# Patient Record
Sex: FEMALE | Race: WHITE | NOT HISPANIC OR LATINO
[De-identification: names, ages, dates, MRNs, and addresses within clinical notes are randomized per-mention and may not be internally consistent; named-entity substitution may affect disease eponyms.]

---

## 2019-04-01 ENCOUNTER — RESULT REVIEW (OUTPATIENT)
Age: 24
End: 2019-04-01

## 2019-10-24 ENCOUNTER — INPATIENT (INPATIENT)
Facility: HOSPITAL | Age: 24
LOS: 2 days | Discharge: HOME | End: 2019-10-27
Attending: OBSTETRICS & GYNECOLOGY | Admitting: OBSTETRICS & GYNECOLOGY
Payer: MEDICAID

## 2019-10-24 VITALS
TEMPERATURE: 99 F | HEART RATE: 67 BPM | DIASTOLIC BLOOD PRESSURE: 62 MMHG | SYSTOLIC BLOOD PRESSURE: 113 MMHG | RESPIRATION RATE: 16 BRPM | OXYGEN SATURATION: 100 %

## 2019-10-24 DIAGNOSIS — O36.5930 MATERNAL CARE FOR OTHER KNOWN OR SUSPECTED POOR FETAL GROWTH, THIRD TRIMESTER, NOT APPLICABLE OR UNSPECIFIED: ICD-10-CM

## 2019-10-24 DIAGNOSIS — Z3A.00 WEEKS OF GESTATION OF PREGNANCY NOT SPECIFIED: ICD-10-CM

## 2019-10-24 DIAGNOSIS — O26.899 OTHER SPECIFIED PREGNANCY RELATED CONDITIONS, UNSPECIFIED TRIMESTER: ICD-10-CM

## 2019-10-24 LAB
BASOPHILS # BLD AUTO: 0.03 K/UL — SIGNIFICANT CHANGE UP (ref 0–0.2)
BASOPHILS NFR BLD AUTO: 0.3 % — SIGNIFICANT CHANGE UP (ref 0–2)
EOSINOPHIL # BLD AUTO: 0.03 K/UL — SIGNIFICANT CHANGE UP (ref 0–0.5)
EOSINOPHIL NFR BLD AUTO: 0.3 % — SIGNIFICANT CHANGE UP (ref 0–6)
HCT VFR BLD CALC: 31 % — LOW (ref 34.5–45)
HGB BLD-MCNC: 9.4 G/DL — LOW (ref 11.5–15.5)
IMM GRANULOCYTES NFR BLD AUTO: 0.6 % — SIGNIFICANT CHANGE UP (ref 0–1.5)
LYMPHOCYTES # BLD AUTO: 2.23 K/UL — SIGNIFICANT CHANGE UP (ref 1–3.3)
LYMPHOCYTES # BLD AUTO: 24.5 % — SIGNIFICANT CHANGE UP (ref 13–44)
MCHC RBC-ENTMCNC: 24.5 PG — LOW (ref 27–34)
MCHC RBC-ENTMCNC: 30.3 % — LOW (ref 32–36)
MCV RBC AUTO: 80.7 FL — SIGNIFICANT CHANGE UP (ref 80–100)
MONOCYTES # BLD AUTO: 0.63 K/UL — SIGNIFICANT CHANGE UP (ref 0–0.9)
MONOCYTES NFR BLD AUTO: 6.9 % — SIGNIFICANT CHANGE UP (ref 2–14)
NEUTROPHILS # BLD AUTO: 6.12 K/UL — SIGNIFICANT CHANGE UP (ref 1.8–7.4)
NEUTROPHILS NFR BLD AUTO: 67.4 % — SIGNIFICANT CHANGE UP (ref 43–77)
NRBC # FLD: 0 K/UL — SIGNIFICANT CHANGE UP (ref 0–0)
PLATELET # BLD AUTO: 213 K/UL — SIGNIFICANT CHANGE UP (ref 150–400)
PMV BLD: 11.4 FL — SIGNIFICANT CHANGE UP (ref 7–13)
RBC # BLD: 3.84 M/UL — SIGNIFICANT CHANGE UP (ref 3.8–5.2)
RBC # FLD: 15.3 % — HIGH (ref 10.3–14.5)
WBC # BLD: 9.09 K/UL — SIGNIFICANT CHANGE UP (ref 3.8–10.5)
WBC # FLD AUTO: 9.09 K/UL — SIGNIFICANT CHANGE UP (ref 3.8–10.5)

## 2019-10-24 PROCEDURE — 93010 ELECTROCARDIOGRAM REPORT: CPT

## 2019-10-24 RX ORDER — SODIUM CHLORIDE 9 MG/ML
1000 INJECTION, SOLUTION INTRAVENOUS
Refills: 0 | Status: DISCONTINUED | OUTPATIENT
Start: 2019-10-24 | End: 2019-10-25

## 2019-10-24 RX ORDER — SODIUM CHLORIDE 9 MG/ML
1000 INJECTION, SOLUTION INTRAVENOUS
Refills: 0 | Status: DISCONTINUED | OUTPATIENT
Start: 2019-10-24 | End: 2019-10-24

## 2019-10-24 RX ORDER — OXYTOCIN 10 UNIT/ML
333.33 VIAL (ML) INJECTION
Qty: 20 | Refills: 0 | Status: DISCONTINUED | OUTPATIENT
Start: 2019-10-24 | End: 2019-10-24

## 2019-10-24 RX ORDER — INFLUENZA VIRUS VACCINE 15; 15; 15; 15 UG/.5ML; UG/.5ML; UG/.5ML; UG/.5ML
0.5 SUSPENSION INTRAMUSCULAR ONCE
Refills: 0 | Status: COMPLETED | OUTPATIENT
Start: 2019-10-24 | End: 2019-10-24

## 2019-10-24 RX ORDER — OXYTOCIN 10 UNIT/ML
333.33 VIAL (ML) INJECTION
Qty: 20 | Refills: 0 | Status: DISCONTINUED | OUTPATIENT
Start: 2019-10-24 | End: 2019-10-25

## 2019-10-24 RX ADMIN — SODIUM CHLORIDE 125 MILLILITER(S): 9 INJECTION, SOLUTION INTRAVENOUS at 19:07

## 2019-10-24 NOTE — OB PROVIDER H&P - NSHPPHYSICALEXAM_GEN_ALL_CORE
22 yo  @ 38.6 wks comes in c/o needing to be induced sent in from Dr Rocha office, saw Dr Wagner and followed by IUGR, last 3 fetal growth scans show n growth <10% 2565 gms no change from last growth scan sent to be induced. denies vb or lof. reports +GFM. 1-2 cms in office, ADRIANA 13, BPP 10/10, elevated dopplers.    GBS; negative  abd: soft, gravid, NT  TAS: cephalic  SVE: /-3  FHT: + accels, mod variability  toco: irregular  Vital Signs Last 24 Hrs  T(C): 36.9 (24 Oct 2019 16:33), Max: 37.1 (24 Oct 2019 16:14)  T(F): 98.42 (24 Oct 2019 16:33), Max: 98.8 (24 Oct 2019 16:14)  HR: 75 (24 Oct 2019 16:34) (67 - 75)  BP: 114/63 (24 Oct 2019 16:34) (113/62 - 114/63)  BP(mean): --  RR: 16 (24 Oct 2019 16:14) (16 - 16)  SpO2: 100% (24 Oct 2019 16:14) (100% - 100%)    meds: PNV  All: PCN rash    PMH: denies  PSH: denies  gynhx: denies  ob hx: denies

## 2019-10-24 NOTE — OB RN PATIENT PROFILE - ALERT: PERTINENT HISTORY
1st Trimester Sonogram/Follow up Sonogram for Growth/20 Week Level II Sonogram/BioPhysical Profile(s)/Fetal Non-Stress Test (NST)

## 2019-10-24 NOTE — OB PROVIDER H&P - ALERT: PERTINENT HISTORY
Follow up Sonogram for Growth/1st Trimester Sonogram/Fetal Non-Stress Test (NST)/20 Week Level II Sonogram/BioPhysical Profile(s)

## 2019-10-24 NOTE — CHART NOTE - NSCHARTNOTEFT_GEN_A_CORE
R1 Note: Pt examined at bedside for SVE     VS  T(C): 36.8 (10-24-19 @ 18:02)  HR: 67 (10-24-19 @ 22:06)  BP: 115/70 (10-24-19 @ 18:02)  RR: 16 (10-24-19 @ 17:38)  SpO2: 98% (10-24-19 @ 22:06)      SVE: 2.5/60/-3  FHT: 140 bpm mod variability, +acel, -decel  [place]: irregular    Plan:  IOL IUGR  FHT Cat 1 reassuring  4PO    to be d/w Dr. Esther Martínez PGY1

## 2019-10-24 NOTE — OB PROVIDER TRIAGE NOTE - NSHPPHYSICALEXAM_GEN_ALL_CORE
24 yo  @ 38.6 wks comes in c/o needing to be induced sent in from Dr Rocha office, saw Dr Wagner and followed by IUGR, last 3 fetal growth scans show n growth <10% 2565 gms no change from last growth scan sent to be induced. denies vb or lof. reports +GFM. 1-2 cms in office, ADRIANA 13, BPP 10/10, elevated dopplers.    GBS; negative  abd: soft, gravid, NT  TAS: cephalic  SVE: /-3  FHT: + accels, mod variability  toco: irregular  Vital Signs Last 24 Hrs  T(C): 36.9 (24 Oct 2019 16:33), Max: 37.1 (24 Oct 2019 16:14)  T(F): 98.42 (24 Oct 2019 16:33), Max: 98.8 (24 Oct 2019 16:14)  HR: 75 (24 Oct 2019 16:34) (67 - 75)  BP: 114/63 (24 Oct 2019 16:34) (113/62 - 114/63)  BP(mean): --  RR: 16 (24 Oct 2019 16:14) (16 - 16)  SpO2: 100% (24 Oct 2019 16:14) (100% - 100%)    meds: PNV  All: PCN rash    PMH: denies  PSH: denies  gynhx: denies  ob hx: denies

## 2019-10-24 NOTE — OB PROVIDER H&P - HISTORY OF PRESENT ILLNESS
24 yo  @ 38.6 wks comes in c/o needing to be induced sent in from Dr Rocha office, saw Dr Wagner and followed by IUGR, last 3 fetal growth scans show n growth <10% 2565 gms no change from last growth scan sent to be induced. denies vb or lof. reports +GFM. 1-2 cms in office, ADRIANA 13, BPP 10/10, elevated dopplers.    GBS; negative    meds: PNV  All: PCN rash    PMH: denies  PSH: denies  gynhx: denies  ob hx: denies

## 2019-10-24 NOTE — OB PROVIDER TRIAGE NOTE - NSOBPROVIDERNOTE_OBGYN_ALL_OB_FT
22 yo  @ 38.6 wks comes in c/o needing to be induced sent in from Dr Rocha office, saw Dr Wagner and followed by IUGR, last 3 fetal growth scans show n growth <10% 2565 gms no change from last growth scan sent to be induced. denies vb or lof. reports +GFM. 1-2 cms in office, ADRIANA 13, BPP 10/10, elevated dopplers.    GBS; negative  abd: soft, gravid, NT  TAS: cephalic  SVE: 60/-3  FHT: + accels, mod variability  toco: irregular  Vital Signs Last 24 Hrs  T(C): 36.9 (24 Oct 2019 16:33), Max: 37.1 (24 Oct 2019 16:14)  T(F): 98.42 (24 Oct 2019 16:33), Max: 98.8 (24 Oct 2019 16:14)  HR: 75 (24 Oct 2019 16:34) (67 - 75)  BP: 114/63 (24 Oct 2019 16:34) (113/62 - 114/63)  BP(mean): --  RR: 16 (24 Oct 2019 16:14) (16 - 16)  SpO2: 100% (24 Oct 2019 16:14) (100% - 100%)    meds: PNV  All: PCN rash    PMH: denies  PSH: denies  gynhx: denies  ob hx: denies    plan  d/w Dr Wagner admit for IOL @ 38.6 wks for IUGR not fetal growth <10%  epidural request  for PO cytotec  pulse ox/EKG- for Dr Wagner reporting c/o SOB in office, none in D&T

## 2019-10-24 NOTE — OB PROVIDER H&P - PROBLEM SELECTOR PLAN 1
d/w  Sample admit for IOL @ 38.6 wks for IUGR not fetal growth <10%  epidural request  for PO cytotec  pulse ox/EKG- for  Sample reporting c/o SOB in office, none in D&T

## 2019-10-25 ENCOUNTER — TRANSCRIPTION ENCOUNTER (OUTPATIENT)
Age: 24
End: 2019-10-25

## 2019-10-25 LAB — T PALLIDUM AB TITR SER: NEGATIVE — SIGNIFICANT CHANGE UP

## 2019-10-25 PROCEDURE — 59410 OBSTETRICAL CARE: CPT | Mod: U9

## 2019-10-25 RX ORDER — AER TRAVELER 0.5 G/1
1 SOLUTION RECTAL; TOPICAL EVERY 4 HOURS
Refills: 0 | Status: DISCONTINUED | OUTPATIENT
Start: 2019-10-25 | End: 2019-10-27

## 2019-10-25 RX ORDER — HYDROCORTISONE 1 %
1 OINTMENT (GRAM) TOPICAL EVERY 6 HOURS
Refills: 0 | Status: DISCONTINUED | OUTPATIENT
Start: 2019-10-25 | End: 2019-10-27

## 2019-10-25 RX ORDER — MAGNESIUM HYDROXIDE 400 MG/1
30 TABLET, CHEWABLE ORAL
Refills: 0 | Status: DISCONTINUED | OUTPATIENT
Start: 2019-10-25 | End: 2019-10-27

## 2019-10-25 RX ORDER — PRAMOXINE HYDROCHLORIDE 150 MG/15G
1 AEROSOL, FOAM RECTAL EVERY 4 HOURS
Refills: 0 | Status: DISCONTINUED | OUTPATIENT
Start: 2019-10-25 | End: 2019-10-27

## 2019-10-25 RX ORDER — DIPHENHYDRAMINE HCL 50 MG
25 CAPSULE ORAL EVERY 6 HOURS
Refills: 0 | Status: DISCONTINUED | OUTPATIENT
Start: 2019-10-25 | End: 2019-10-27

## 2019-10-25 RX ORDER — TETANUS TOXOID, REDUCED DIPHTHERIA TOXOID AND ACELLULAR PERTUSSIS VACCINE, ADSORBED 5; 2.5; 8; 8; 2.5 [IU]/.5ML; [IU]/.5ML; UG/.5ML; UG/.5ML; UG/.5ML
0.5 SUSPENSION INTRAMUSCULAR ONCE
Refills: 0 | Status: DISCONTINUED | OUTPATIENT
Start: 2019-10-25 | End: 2019-10-27

## 2019-10-25 RX ORDER — LANOLIN
1 OINTMENT (GRAM) TOPICAL EVERY 6 HOURS
Refills: 0 | Status: DISCONTINUED | OUTPATIENT
Start: 2019-10-25 | End: 2019-10-27

## 2019-10-25 RX ORDER — OXYTOCIN 10 UNIT/ML
333.33 VIAL (ML) INJECTION
Qty: 20 | Refills: 0 | Status: DISCONTINUED | OUTPATIENT
Start: 2019-10-25 | End: 2019-10-27

## 2019-10-25 RX ORDER — GLYCERIN ADULT
1 SUPPOSITORY, RECTAL RECTAL AT BEDTIME
Refills: 0 | Status: DISCONTINUED | OUTPATIENT
Start: 2019-10-25 | End: 2019-10-27

## 2019-10-25 RX ORDER — BENZOCAINE 10 %
1 GEL (GRAM) MUCOUS MEMBRANE EVERY 6 HOURS
Refills: 0 | Status: DISCONTINUED | OUTPATIENT
Start: 2019-10-25 | End: 2019-10-27

## 2019-10-25 RX ORDER — BUTORPHANOL TARTRATE 2 MG/ML
2 INJECTION, SOLUTION INTRAMUSCULAR; INTRAVENOUS ONCE
Refills: 0 | Status: DISCONTINUED | OUTPATIENT
Start: 2019-10-25 | End: 2019-10-25

## 2019-10-25 RX ORDER — SIMETHICONE 80 MG/1
80 TABLET, CHEWABLE ORAL EVERY 4 HOURS
Refills: 0 | Status: DISCONTINUED | OUTPATIENT
Start: 2019-10-25 | End: 2019-10-27

## 2019-10-25 RX ORDER — SODIUM CHLORIDE 9 MG/ML
3 INJECTION INTRAMUSCULAR; INTRAVENOUS; SUBCUTANEOUS EVERY 8 HOURS
Refills: 0 | Status: DISCONTINUED | OUTPATIENT
Start: 2019-10-25 | End: 2019-10-27

## 2019-10-25 RX ORDER — DIBUCAINE 1 %
1 OINTMENT (GRAM) RECTAL EVERY 6 HOURS
Refills: 0 | Status: DISCONTINUED | OUTPATIENT
Start: 2019-10-25 | End: 2019-10-27

## 2019-10-25 RX ADMIN — Medication 1000 MILLIUNIT(S)/MIN: at 14:19

## 2019-10-25 RX ADMIN — BUTORPHANOL TARTRATE 2 MILLIGRAM(S): 2 INJECTION, SOLUTION INTRAMUSCULAR; INTRAVENOUS at 03:03

## 2019-10-25 RX ADMIN — BUTORPHANOL TARTRATE 2 MILLIGRAM(S): 2 INJECTION, SOLUTION INTRAMUSCULAR; INTRAVENOUS at 03:18

## 2019-10-25 NOTE — CHART NOTE - NSCHARTNOTEFT_GEN_A_CORE
PA Note    patient seen & examined for cont of management. s/p epidural. comfortable    VS  T(C): 37.0 (10-25-19 @ 06:00)  HR: 67 (10-25-19 @ 09:03)  BP: 102/59 (10-25-19 @ 09:01)  RR: 16 (10-24-19 @ 17:38)  SpO2: 98% (10-25-19 @ 08:57)    130/mod vignesh/+accels/no decels  [place] q 2-4min  3/60/-3    cont efm/toco   cont PO cytotec   dw Dr Epstein

## 2019-10-25 NOTE — DISCHARGE NOTE OB - MEDICATION SUMMARY - MEDICATIONS TO TAKE
I will START or STAY ON the medications listed below when I get home from the hospital:    ibuprofen 600 mg oral tablet  -- 1 tab(s) by mouth every 6 hours  -- Indication: For Vaginal delivery    acetaminophen 325 mg oral tablet  -- 3 tab(s) by mouth   -- Indication: For Vaginal delivery    Prenatal Multivitamins with Folic Acid 1 mg oral tablet  -- 1 tab(s) by mouth once a day  -- Indication: For Vaginal delivery

## 2019-10-25 NOTE — DISCHARGE NOTE OB - CARE PLAN
Principal Discharge DX:	Vaginal delivery  Goal:	fully recovered  Assessment and plan of treatment:	dr davila 6 weeks

## 2019-10-25 NOTE — DISCHARGE NOTE OB - PROVIDER TOKENS
PROVIDER:[TOKEN:[[id number]]] PROVIDER:[TOKEN:[[id number]]],FREE:[LAST:[lola],FIRST:[Chente],PHONE:[phone]],FAX:[(   )    -]]

## 2019-10-25 NOTE — DISCHARGE NOTE OB - CARE PROVIDER_API CALL
Chente Rocha)  MaternalFetal Medicine; Obstetrics and Gynecology  [address] [place] [address]  Phone: [phone]  Fax: [phone]  Follow Up Time: Chente Rocha)  MaternalFetal Medicine; Obstetrics and Gynecology  [address] [place] [address]  Phone: [phone]  Fax: [phone]  Follow Up Time:     Chente rocha  Phone: [phone]  Fax: (   )    -  Follow Up Time:

## 2019-10-25 NOTE — DISCHARGE NOTE OB - CARE PROVIDERS DIRECT ADDRESSES
[email or web address]

## 2019-10-25 NOTE — CHART NOTE - NSCHARTNOTEFT_GEN_A_CORE
R2 Labor Note    At bedside for variables    Vital Signs Last 24 Hrs  T(C): 36.9 (25 Oct 2019 01:47), Max: 37.1 (24 Oct 2019 16:14)  T(F): 98.42 (25 Oct 2019 01:47), Max: 98.8 (24 Oct 2019 16:14)  HR: 68 (25 Oct 2019 05:19) (55 - 90)  BP: 104/- (25 Oct 2019 05:19) (92/53 - 119/59)  BP(mean): --  RR: 16 (24 Oct 2019 17:38) (16 - 16)  SpO2: 98% (25 Oct 2019 05:18) (91% - 100%)    FETAL HEART RATE: 145, mod vignesh, no accels, no decels. Discontinuous at times.    [place]: ctx3-4/10min     CERVICAL EXAM: 4.5/90/-2    PAIN SCALE (0-10): 8/10    IMPRESSION: Cat 2 tracing. Patient making good change. Will order morphine for pain control as patient cannot get an epidural per anesthesia 2/2 last Lovenox dose. Nurses to re-adjust monitors, if continous tracing, will not need ISE during epidural.     D/w Dr. Pérez Castillo PGY-2

## 2019-10-25 NOTE — OB RN DELIVERY SUMMARY - NS_SEPSISRSKCALC_OBGYN_ALL_OB_FT
EOS calculated successfully. EOS Risk Factor: 0.5/1000 live births ([facility] national incidence); GA=39w;Temp=98.96; ROM=2.533; GBS='Negative'; Antibiotics='No antibiotics or any antibiotics < 2 hrs prior to birth'

## 2019-10-25 NOTE — CHART NOTE - NSCHARTNOTEFT_GEN_A_CORE
R1 Note: Pt evaluated at bedside for SVE requesting pain medication    VS  T(C): 36.9 (10-25-19 @ 01:47)  HR: 56 (10-25-19 @ 02:48)  BP: 119/59 (10-25-19 @ 02:13)  RR: 16 (10-24-19 @ 17:38)  SpO2: 98% (10-25-19 @ 02:48)    SVE: unchanged  FHT: 140 bpm, mod vignesh, +acel, -decel  [place]: irregular    Plan:  IOL IUGR  PO Cyt   Stadol for pain mgmt  FHT Cat 1 reassuring    d/w Dr. Esther Martínez PGY1

## 2019-10-25 NOTE — DISCHARGE NOTE OB - PATIENT PORTAL LINK FT
You can access the FollowMyHealth Patient Portal offered by [facility] by registering at the following website: [email or web address]. By joining [facility]’s FollowMyHealth portal, you will also be able to view your health information using other applications (apps) compatible with our system.

## 2019-10-25 NOTE — CHART NOTE - NSCHARTNOTEFT_GEN_A_CORE
PA Note    patient seen & examined for back pain & pressure    VS  T(C): 37.2 (10-25-19 @ 13:00)  HR: 104 (10-25-19 @ 13:07)  BP: 110/56 (10-25-19 @ 13:00)  RR: 18 (10-25-19 @ 13:00)  SpO2: 99% (10-25-19 @ 13:07)    /mod vignesh/+accels/no decels  [place] q 3-5min  10/100/+1    cont efm/toco  Dr Epstein aware & en route to hospital  epidural top-off  [facility] attendings aware  ella meier

## 2019-10-25 NOTE — OB PROVIDER DELIVERY SUMMARY - NSPROVIDERDELIVERYNOTE_OBGYN_ALL_OB_FT
Spontaneous vaginal delivery of liveborn infant  from KANDACE position. Head, shoulders, and  body delivered easily. Infant was suctioned.  No mec. Cord was clamped and cut and  infant was passed to mother. Placenta  delivered intact with a 3 vessel cord. Fundal  massage was given and uterine fundus was  found to be firm. Vaginal exam revealed an  intact cervix, vaginal walls and sulci.  Excellent hemostasis was noted. patient  was stable and went to recovery. Count was  correct x 2.

## 2019-10-26 RX ORDER — IBUPROFEN 200 MG
600 TABLET ORAL EVERY 6 HOURS
Refills: 0 | Status: COMPLETED | OUTPATIENT
Start: 2019-10-26 | End: 2020-09-23

## 2019-10-26 RX ORDER — OXYCODONE HYDROCHLORIDE 5 MG/1
5 TABLET ORAL
Refills: 0 | Status: DISCONTINUED | OUTPATIENT
Start: 2019-10-26 | End: 2019-10-27

## 2019-10-26 RX ORDER — OXYCODONE HYDROCHLORIDE 5 MG/1
5 TABLET ORAL ONCE
Refills: 0 | Status: DISCONTINUED | OUTPATIENT
Start: 2019-10-26 | End: 2019-10-27

## 2019-10-26 RX ORDER — ACETAMINOPHEN 500 MG
975 TABLET ORAL
Refills: 0 | Status: DISCONTINUED | OUTPATIENT
Start: 2019-10-26 | End: 2019-10-27

## 2019-10-26 RX ORDER — IBUPROFEN 200 MG
600 TABLET ORAL EVERY 6 HOURS
Refills: 0 | Status: DISCONTINUED | OUTPATIENT
Start: 2019-10-26 | End: 2019-10-27

## 2019-10-26 RX ADMIN — Medication 600 MILLIGRAM(S): at 11:54

## 2019-10-26 RX ADMIN — SODIUM CHLORIDE 3 MILLILITER(S): 9 INJECTION INTRAMUSCULAR; INTRAVENOUS; SUBCUTANEOUS at 00:48

## 2019-10-26 RX ADMIN — Medication 1 TABLET(S): at 11:55

## 2019-10-26 RX ADMIN — Medication 600 MILLIGRAM(S): at 02:30

## 2019-10-26 RX ADMIN — Medication 600 MILLIGRAM(S): at 01:58

## 2019-10-26 NOTE — PROGRESS NOTE ADULT - SUBJECTIVE AND OBJECTIVE BOX
Anesthesia Post-op Note    POD#1 S/P vaginal delivery    Patient is doing well.  OOBAA. Tolerating clears.  Pain is tolerable.  No residual anesthetic issues or complications noted.    Margarita Burrows CRNA

## 2019-10-26 NOTE — PROGRESS NOTE ADULT - SUBJECTIVE AND OBJECTIVE BOX
S: Patient doing well. No complaints. Minimal lochia. Pain controlled.    O: Vital Signs Last 24 Hrs  T(C): 36.6 (26 Oct 2019 09:22), Max: 37.8 (25 Oct 2019 15:25)  T(F): 97.9 (26 Oct 2019 09:22), Max: 100 (25 Oct 2019 15:25)  HR: 54 (26 Oct 2019 09:22) (54 - 121)  BP: 104/47 (26 Oct 2019 09:22) (97/55 - 130/63)  BP(mean): --  RR: 17 (26 Oct 2019 09:22) (16 - 18)  SpO2: 100% (26 Oct 2019 09:22) (81% - 100%)    Gen: NAD  Abd: soft, Nontender, Nondistended, fundus firm  Ext: no tendern, mild edema    Labs:                        9.4    9.09  )-----------( 213      ( 24 Oct 2019 18:24 )             31.0       A: 23y PPD# s/p  doing well.    Plan:  Routine postpartum care  Encouraged out of bed  Regular diet

## 2019-10-27 VITALS
RESPIRATION RATE: 17 BRPM | DIASTOLIC BLOOD PRESSURE: 52 MMHG | HEART RATE: 72 BPM | TEMPERATURE: 99 F | OXYGEN SATURATION: 100 % | SYSTOLIC BLOOD PRESSURE: 118 MMHG

## 2019-10-27 RX ORDER — IBUPROFEN 200 MG
1 TABLET ORAL
Qty: 0 | Refills: 0 | DISCHARGE
Start: 2019-10-27

## 2019-10-27 RX ORDER — ACETAMINOPHEN 500 MG
3 TABLET ORAL
Qty: 0 | Refills: 0 | DISCHARGE
Start: 2019-10-27

## 2019-10-27 RX ADMIN — Medication 600 MILLIGRAM(S): at 03:38

## 2019-10-27 RX ADMIN — Medication 600 MILLIGRAM(S): at 03:08

## 2019-10-27 RX ADMIN — Medication 1 TABLET(S): at 13:51

## 2019-10-27 NOTE — LACTATION INITIAL EVALUATION - INTERVENTION OUTCOME
verbalizes understanding/Patient stated breastfeeding is going well .  No further assistance needed.

## 2019-10-27 NOTE — LACTATION INITIAL EVALUATION - LACTATION INTERVENTIONS
Reviewed [facility] book, hand expression, feeding log, proper latch.  Encouraged to feed on cue and follow the feeding log. Discussed outpatient resources  available, warm line , breastfeeding support group./initiate skin to skin/initiate hand expression routine

## 2019-12-09 ENCOUNTER — APPOINTMENT (OUTPATIENT)
Dept: OBGYN | Facility: CLINIC | Age: 24
End: 2019-12-09

## 2019-12-16 ENCOUNTER — APPOINTMENT (OUTPATIENT)
Dept: OBGYN | Facility: CLINIC | Age: 24
End: 2019-12-16

## 2020-01-06 ENCOUNTER — APPOINTMENT (OUTPATIENT)
Dept: OBGYN | Facility: CLINIC | Age: 25
End: 2020-01-06
Payer: MEDICAID

## 2020-01-06 PROCEDURE — 81025 URINE PREGNANCY TEST: CPT

## 2020-01-06 PROCEDURE — 99212 OFFICE O/P EST SF 10 MIN: CPT

## 2020-01-13 ENCOUNTER — APPOINTMENT (OUTPATIENT)
Dept: ANTEPARTUM | Facility: CLINIC | Age: 25
End: 2020-01-13
Payer: MEDICAID

## 2020-01-13 PROCEDURE — 76816 OB US FOLLOW-UP PER FETUS: CPT

## 2020-01-13 PROCEDURE — 76819 FETAL BIOPHYS PROFIL W/O NST: CPT

## 2020-01-28 ENCOUNTER — APPOINTMENT (OUTPATIENT)
Dept: ANTEPARTUM | Facility: CLINIC | Age: 25
End: 2020-01-28

## 2020-02-04 ENCOUNTER — APPOINTMENT (OUTPATIENT)
Dept: ANTEPARTUM | Facility: CLINIC | Age: 25
End: 2020-02-04
Payer: MEDICAID

## 2020-02-04 PROCEDURE — 76801 OB US < 14 WKS SINGLE FETUS: CPT

## 2020-02-28 ENCOUNTER — APPOINTMENT (OUTPATIENT)
Dept: ANTEPARTUM | Facility: CLINIC | Age: 25
End: 2020-02-28
Payer: MEDICAID

## 2020-02-28 PROCEDURE — 76813 OB US NUCHAL MEAS 1 GEST: CPT

## 2020-03-05 ENCOUNTER — APPOINTMENT (OUTPATIENT)
Dept: OBGYN | Facility: CLINIC | Age: 25
End: 2020-03-05

## 2020-03-24 ENCOUNTER — APPOINTMENT (OUTPATIENT)
Dept: OBGYN | Facility: CLINIC | Age: 25
End: 2020-03-24

## 2020-04-24 ENCOUNTER — APPOINTMENT (OUTPATIENT)
Dept: ANTEPARTUM | Facility: CLINIC | Age: 25
End: 2020-04-24

## 2020-04-30 ENCOUNTER — APPOINTMENT (OUTPATIENT)
Dept: ANTEPARTUM | Facility: CLINIC | Age: 25
End: 2020-04-30
Payer: MEDICAID

## 2020-04-30 PROCEDURE — 76817 TRANSVAGINAL US OBSTETRIC: CPT

## 2020-04-30 PROCEDURE — 76811 OB US DETAILED SNGL FETUS: CPT

## 2020-05-08 ENCOUNTER — APPOINTMENT (OUTPATIENT)
Dept: PEDIATRIC CARDIOLOGY | Facility: CLINIC | Age: 25
End: 2020-05-08
Payer: MEDICAID

## 2020-05-08 PROCEDURE — 76827 ECHO EXAM OF FETAL HEART: CPT

## 2020-05-08 PROCEDURE — 76825 ECHO EXAM OF FETAL HEART: CPT

## 2020-05-08 PROCEDURE — 76821 MIDDLE CEREBRAL ARTERY ECHO: CPT

## 2020-05-08 PROCEDURE — 99201 OFFICE OUTPATIENT NEW 10 MINUTES: CPT | Mod: 25

## 2020-05-08 PROCEDURE — 93325 DOPPLER ECHO COLOR FLOW MAPG: CPT | Mod: 59

## 2020-05-08 PROCEDURE — 76820 UMBILICAL ARTERY ECHO: CPT

## 2020-06-04 ENCOUNTER — APPOINTMENT (OUTPATIENT)
Dept: OBGYN | Facility: CLINIC | Age: 25
End: 2020-06-04
Payer: MEDICAID

## 2020-06-04 PROCEDURE — 0502F SUBSEQUENT PRENATAL CARE: CPT

## 2020-07-13 ENCOUNTER — APPOINTMENT (OUTPATIENT)
Dept: ANTEPARTUM | Facility: CLINIC | Age: 25
End: 2020-07-13
Payer: MEDICAID

## 2020-07-13 PROCEDURE — 76819 FETAL BIOPHYS PROFIL W/O NST: CPT

## 2020-07-13 PROCEDURE — 76816 OB US FOLLOW-UP PER FETUS: CPT

## 2020-07-27 ENCOUNTER — APPOINTMENT (OUTPATIENT)
Dept: OBGYN | Facility: CLINIC | Age: 25
End: 2020-07-27
Payer: MEDICAID

## 2020-07-27 PROCEDURE — 0502F SUBSEQUENT PRENATAL CARE: CPT

## 2020-08-13 ENCOUNTER — OUTPATIENT (OUTPATIENT)
Dept: INPATIENT UNIT | Facility: HOSPITAL | Age: 25
LOS: 1 days | Discharge: HOME | End: 2020-08-13
Payer: MEDICAID

## 2020-08-13 VITALS — TEMPERATURE: 99 F

## 2020-08-13 VITALS — SYSTOLIC BLOOD PRESSURE: 113 MMHG | HEART RATE: 70 BPM | DIASTOLIC BLOOD PRESSURE: 58 MMHG

## 2020-08-13 DIAGNOSIS — Z3A.00 WEEKS OF GESTATION OF PREGNANCY NOT SPECIFIED: ICD-10-CM

## 2020-08-13 DIAGNOSIS — O26.899 OTHER SPECIFIED PREGNANCY RELATED CONDITIONS, UNSPECIFIED TRIMESTER: ICD-10-CM

## 2020-08-13 LAB
APPEARANCE UR: CLEAR — SIGNIFICANT CHANGE UP
BACTERIA # UR AUTO: NEGATIVE — SIGNIFICANT CHANGE UP
BILIRUB UR-MCNC: NEGATIVE — SIGNIFICANT CHANGE UP
BLOOD UR QL VISUAL: NEGATIVE — SIGNIFICANT CHANGE UP
COLOR SPEC: SIGNIFICANT CHANGE UP
GLUCOSE UR-MCNC: NEGATIVE — SIGNIFICANT CHANGE UP
HYALINE CASTS # UR AUTO: NEGATIVE — SIGNIFICANT CHANGE UP
KETONES UR-MCNC: NEGATIVE — SIGNIFICANT CHANGE UP
LEUKOCYTE ESTERASE UR-ACNC: SIGNIFICANT CHANGE UP
NITRITE UR-MCNC: NEGATIVE — SIGNIFICANT CHANGE UP
PH UR: 7.5 — SIGNIFICANT CHANGE UP (ref 5–8)
PROT UR-MCNC: NEGATIVE — SIGNIFICANT CHANGE UP
RBC CASTS # UR COMP ASSIST: SIGNIFICANT CHANGE UP (ref 0–?)
SP GR SPEC: 1.01 — SIGNIFICANT CHANGE UP (ref 1–1.04)
SQUAMOUS # UR AUTO: SIGNIFICANT CHANGE UP
UROBILINOGEN FLD QL: NORMAL — SIGNIFICANT CHANGE UP
WBC UR QL: SIGNIFICANT CHANGE UP (ref 0–?)

## 2020-08-13 PROCEDURE — 99214 OFFICE O/P EST MOD 30 MIN: CPT

## 2020-08-13 PROCEDURE — 76818 FETAL BIOPHYS PROFILE W/NST: CPT | Mod: 26

## 2020-08-13 NOTE — OB PROVIDER TRIAGE NOTE - NSOBPROVIDERNOTE_OBGYN_ALL_OB_FT
No evidence of  labor at this time     d/w Dr. Avelar     -Cleared for discharge   -Keep scheduled appt Monday  -Fetal kick count reviewed   -Warning signs reviewed

## 2020-08-13 NOTE — OB PROVIDER TRIAGE NOTE - NSHPPHYSICALEXAM_GEN_ALL_CORE
Vital Signs Last 24 Hrs  T(C): 37 (13 Aug 2020 21:24), Max: 37.0 (13 Aug 2020 21:20)  T(F): 98.6 (13 Aug 2020 21:24), Max: 98.6 (13 Aug 2020 21:20)  HR: 74 (13 Aug 2020 21:24) (74 - 74)  BP: 113/57 (13 Aug 2020 21:24) (113/57 - 113/57)  RR: 16 (13 Aug 2020 21:24) (16 - 16)    Abdomen soft, nontender   No  cva tenderness     SVE 2.5/50/-3    NST in progress   irregular contractions by toco Vital Signs Last 24 Hrs  T(C): 37 (13 Aug 2020 21:24), Max: 37.0 (13 Aug 2020 21:20)  T(F): 98.6 (13 Aug 2020 21:24), Max: 98.6 (13 Aug 2020 21:20)  HR: 74 (13 Aug 2020 21:24) (74 - 74)  BP: 113/57 (13 Aug 2020 21:24) (113/57 - 113/57)  RR: 16 (13 Aug 2020 21:24) (16 - 16)    Abdomen soft, nontender   No  cva tenderness     SVE 2.5/50/-3    Category 1 tracing, , moderate variability, + accels, no decels   irregular contractions by toco    Exam unchanged after 2 hours

## 2020-08-13 NOTE — OB PROVIDER TRIAGE NOTE - HISTORY OF PRESENT ILLNESS
23 yo  36 weeks with complaints of contractions that have been intermittent all day but have become more regular after 1900, pt reports pain q 10 min. Pt reports fetal movement, denies leaking of fluid or vaginal bleeding.       Current a/p complications: Denies     Allergies: Penicillin- rash   Meds: PNV  PMH: Denies   PSH: Denies   OB/GYN: 2019 FT  uncomplicated 6#5   Social: Denies   Psychosocial: Denies

## 2020-08-13 NOTE — OB PROVIDER TRIAGE NOTE - NSHPLABSRESULTS_GEN_ALL_CORE
UA pending Urinalysis Basic - ( 13 Aug 2020 22:40 )    Color: LIGHT YELLOW / Appearance: CLEAR / S.011 / pH: 7.5  Gluc: NEGATIVE / Ketone: NEGATIVE  / Bili: NEGATIVE / Urobili: NORMAL   Blood: NEGATIVE / Protein: NEGATIVE / Nitrite: NEGATIVE   Leuk Esterase: TRACE / RBC: 0-2 / WBC 3-5   Sq Epi: OCC / Non Sq Epi: x / Bacteria: NEGATIVE

## 2020-08-13 NOTE — OB PROVIDER TRIAGE NOTE - ADDITIONAL INSTRUCTIONS
-Cleared for discharge   -Keep scheduled appt Monday  -Fetal kick count reviewed   -Warning signs reviewed

## 2020-08-17 ENCOUNTER — APPOINTMENT (OUTPATIENT)
Dept: OBGYN | Facility: CLINIC | Age: 25
End: 2020-08-17

## 2020-08-19 ENCOUNTER — OUTPATIENT (OUTPATIENT)
Dept: INPATIENT UNIT | Facility: HOSPITAL | Age: 25
LOS: 1 days | Discharge: HOME | End: 2020-08-19
Payer: MEDICAID

## 2020-08-19 VITALS — HEART RATE: 65 BPM | DIASTOLIC BLOOD PRESSURE: 57 MMHG | SYSTOLIC BLOOD PRESSURE: 114 MMHG

## 2020-08-19 VITALS
SYSTOLIC BLOOD PRESSURE: 103 MMHG | TEMPERATURE: 98 F | RESPIRATION RATE: 18 BRPM | DIASTOLIC BLOOD PRESSURE: 55 MMHG | HEART RATE: 83 BPM

## 2020-08-19 DIAGNOSIS — O26.899 OTHER SPECIFIED PREGNANCY RELATED CONDITIONS, UNSPECIFIED TRIMESTER: ICD-10-CM

## 2020-08-19 DIAGNOSIS — Z3A.00 WEEKS OF GESTATION OF PREGNANCY NOT SPECIFIED: ICD-10-CM

## 2020-08-19 PROCEDURE — 59025 FETAL NON-STRESS TEST: CPT | Mod: 26

## 2020-08-19 PROCEDURE — 99215 OFFICE O/P EST HI 40 MIN: CPT | Mod: 25

## 2020-08-19 NOTE — OB PROVIDER TRIAGE NOTE - NSOBPROVIDERNOTE_OBGYN_ALL_OB_FT
25yo  female  @ 36.6 wks SLIUP uncomp PNC here with ctx's Q8-10 min  -VE per Dr Rocha-1/long  -NST reviewed by Dr Ly  -pt was dw dr Rocha. Pt was dc home with instructions

## 2020-08-19 NOTE — OB PROVIDER TRIAGE NOTE - HISTORY OF PRESENT ILLNESS
25yo  female  @ 36.6 wks SLIUP uncomp PNC here complaining of ctx's Q8-10 min. Pt is intact with GFM.     Pt requesting Dr Rocha exam her    Pmhx-denies  Pshx/Hosp- denies  Meds-PNV  NKDA  Past ob- FT uncomp  Gyn-denies

## 2020-08-19 NOTE — OB PROVIDER TRIAGE NOTE - NSHPPHYSICALEXAM_GEN_ALL_CORE
Gen: A&O x 3; NAD  Vitals: BP-108/55; P-72; T-36.7    Pulm-CTA B/L; no wheezes  Cor-clear S1S2  Abd exam-soft and nontender    NST cat I with accels and mod variability; infreq ctx    VE per Dr Rocha-1/long/-2

## 2020-08-24 ENCOUNTER — APPOINTMENT (OUTPATIENT)
Dept: OBGYN | Facility: CLINIC | Age: 25
End: 2020-08-24
Payer: MEDICAID

## 2020-08-24 PROCEDURE — 0502F SUBSEQUENT PRENATAL CARE: CPT

## 2020-08-31 ENCOUNTER — APPOINTMENT (OUTPATIENT)
Dept: OBGYN | Facility: CLINIC | Age: 25
End: 2020-08-31
Payer: MEDICAID

## 2020-08-31 ENCOUNTER — TRANSCRIPTION ENCOUNTER (OUTPATIENT)
Age: 25
End: 2020-08-31

## 2020-08-31 ENCOUNTER — INPATIENT (INPATIENT)
Facility: HOSPITAL | Age: 25
LOS: 1 days | Discharge: HOME | End: 2020-09-02
Attending: OBSTETRICS & GYNECOLOGY | Admitting: OBSTETRICS & GYNECOLOGY
Payer: MEDICAID

## 2020-08-31 VITALS — SYSTOLIC BLOOD PRESSURE: 115 MMHG | HEART RATE: 75 BPM | DIASTOLIC BLOOD PRESSURE: 56 MMHG

## 2020-08-31 DIAGNOSIS — O26.899 OTHER SPECIFIED PREGNANCY RELATED CONDITIONS, UNSPECIFIED TRIMESTER: ICD-10-CM

## 2020-08-31 DIAGNOSIS — Z3A.00 WEEKS OF GESTATION OF PREGNANCY NOT SPECIFIED: ICD-10-CM

## 2020-08-31 LAB
BASOPHILS # BLD AUTO: 0.03 K/UL — SIGNIFICANT CHANGE UP (ref 0–0.2)
BASOPHILS NFR BLD AUTO: 0.3 % — SIGNIFICANT CHANGE UP (ref 0–2)
EOSINOPHIL # BLD AUTO: 0.04 K/UL — SIGNIFICANT CHANGE UP (ref 0–0.5)
EOSINOPHIL NFR BLD AUTO: 0.4 % — SIGNIFICANT CHANGE UP (ref 0–6)
HCT VFR BLD CALC: 24.3 % — LOW (ref 34.5–45)
HGB BLD-MCNC: 7.3 G/DL — LOW (ref 11.5–15.5)
IMM GRANULOCYTES NFR BLD AUTO: 0.7 % — SIGNIFICANT CHANGE UP (ref 0–1.5)
LYMPHOCYTES # BLD AUTO: 2.26 K/UL — SIGNIFICANT CHANGE UP (ref 1–3.3)
LYMPHOCYTES # BLD AUTO: 23.1 % — SIGNIFICANT CHANGE UP (ref 13–44)
MCHC RBC-ENTMCNC: 20.3 PG — LOW (ref 27–34)
MCHC RBC-ENTMCNC: 30 % — LOW (ref 32–36)
MCV RBC AUTO: 67.5 FL — LOW (ref 80–100)
MONOCYTES # BLD AUTO: 0.52 K/UL — SIGNIFICANT CHANGE UP (ref 0–0.9)
MONOCYTES NFR BLD AUTO: 5.3 % — SIGNIFICANT CHANGE UP (ref 2–14)
NEUTROPHILS # BLD AUTO: 6.86 K/UL — SIGNIFICANT CHANGE UP (ref 1.8–7.4)
NEUTROPHILS NFR BLD AUTO: 70.2 % — SIGNIFICANT CHANGE UP (ref 43–77)
NRBC # FLD: 0 K/UL — SIGNIFICANT CHANGE UP (ref 0–0)
PLATELET # BLD AUTO: 178 K/UL — SIGNIFICANT CHANGE UP (ref 150–400)
PMV BLD: 11.1 FL — SIGNIFICANT CHANGE UP (ref 7–13)
RBC # BLD: 3.6 M/UL — LOW (ref 3.8–5.2)
RBC # FLD: 19.1 % — HIGH (ref 10.3–14.5)
WBC # BLD: 9.78 K/UL — SIGNIFICANT CHANGE UP (ref 3.8–10.5)
WBC # FLD AUTO: 9.78 K/UL — SIGNIFICANT CHANGE UP (ref 3.8–10.5)

## 2020-08-31 PROCEDURE — 0502F SUBSEQUENT PRENATAL CARE: CPT

## 2020-08-31 RX ORDER — OXYTOCIN 10 UNIT/ML
333.33 VIAL (ML) INJECTION
Qty: 20 | Refills: 0 | Status: DISCONTINUED | OUTPATIENT
Start: 2020-08-31 | End: 2020-09-01

## 2020-08-31 RX ORDER — SODIUM CHLORIDE 9 MG/ML
1000 INJECTION, SOLUTION INTRAVENOUS ONCE
Refills: 0 | Status: COMPLETED | OUTPATIENT
Start: 2020-08-31 | End: 2020-08-31

## 2020-08-31 RX ORDER — SODIUM CHLORIDE 9 MG/ML
1000 INJECTION, SOLUTION INTRAVENOUS
Refills: 0 | Status: DISCONTINUED | OUTPATIENT
Start: 2020-08-31 | End: 2020-09-01

## 2020-08-31 NOTE — OB PROVIDER TRIAGE NOTE - HISTORY OF PRESENT ILLNESS
25 yo  38 4/7 weeks with complaints of worsening contractions. Pt reports fetal movement, denies leaking of fluid or vaginal bleeding.       Current a/p complications: Denies     Allergies: Penicillin- rash   Meds: PNV  PMH: Denies   PSH: Denies   OB/GYN: 2019 FT  uncomplicated 6#5   Social: Denies   Psychosocial: Denies

## 2020-08-31 NOTE — OB PROVIDER TRIAGE NOTE - NS_FETALMONCTXRES_OBGYN_ALL_OB
REVIEW OF SYSTEMS:  General:  +fever, no chills  HEENT: no headache, no vision changes  Cardiac: no chest pain, no palpitations  Respiratory: +cough, no shortness of breath  Gastrointestinal: +abdominal pain, +nausea, +vomiting, no diarrhea  Genitourinary: no hematuria, no dysuria, no urinary frequency  Extremities: no extremity swelling, no extremity pain  Neuro: no focal weakness, no numbness/tingling of the extremities, no decreased sensation  Heme: no easy bleeding, no easy bruising  Skin: no jaundice,  no rashes, no lesions  All other ROS as documented in HPI  -[place], PGY-2
Relaxed

## 2020-08-31 NOTE — OB PROVIDER TRIAGE NOTE - NSOBPROVIDERNOTE_OBGYN_ALL_OB_FT
d/w Dr. Avelar     -Admit to L&D for expectant management   -routine orders  -covid swab sent   -approved for epidural

## 2020-08-31 NOTE — OB PROVIDER TRIAGE NOTE - NSHPPHYSICALEXAM_GEN_ALL_CORE
Vital Signs Last 24 Hrs  T(C): 36.9 (31 Aug 2020 22:11), Max: 36.9 (31 Aug 2020 22:11)  T(F): 98.4 (31 Aug 2020 22:11), Max: 98.4 (31 Aug 2020 22:11)  HR: 75 (31 Aug 2020 22:11) (75 - 75)  BP: 115/56 (31 Aug 2020 22:11) (115/56 - 115/56)  RR: 16 (31 Aug 2020 22:11) (16 - 16)      Abdomen soft, nontender   No  cva tenderness     SVE 4/80/-3    Category 1 tracing, , moderate variability, + accels, no decels   irregular contractions by toco

## 2020-08-31 NOTE — OB PROVIDER H&P - HISTORY OF PRESENT ILLNESS
23 yo  38 4/7 weeks with complaints of worsening contractions. Pt reports fetal movement, denies leaking of fluid or vaginal bleeding.       Current a/p complications: Denies     Allergies: Penicillin- rash   Meds: PNV  PMH: Denies   PSH: Denies   OB/GYN: 2019 FT  uncomplicated 6#5   Social: Denies   Psychosocial: Denies

## 2020-09-01 LAB
BLD GP AB SCN SERPL QL: NEGATIVE — SIGNIFICANT CHANGE UP
RH IG SCN BLD-IMP: POSITIVE — SIGNIFICANT CHANGE UP
RH IG SCN BLD-IMP: POSITIVE — SIGNIFICANT CHANGE UP
SARS-COV-2 RNA SPEC QL NAA+PROBE: SIGNIFICANT CHANGE UP

## 2020-09-01 PROCEDURE — 59400 OBSTETRICAL CARE: CPT | Mod: U9

## 2020-09-01 RX ORDER — SIMETHICONE 80 MG/1
80 TABLET, CHEWABLE ORAL EVERY 4 HOURS
Refills: 0 | Status: DISCONTINUED | OUTPATIENT
Start: 2020-09-01 | End: 2020-09-02

## 2020-09-01 RX ORDER — KETOROLAC TROMETHAMINE 30 MG/ML
30 SYRINGE (ML) INJECTION ONCE
Refills: 0 | Status: DISCONTINUED | OUTPATIENT
Start: 2020-09-01 | End: 2020-09-01

## 2020-09-01 RX ORDER — SODIUM CHLORIDE 9 MG/ML
3 INJECTION INTRAMUSCULAR; INTRAVENOUS; SUBCUTANEOUS EVERY 8 HOURS
Refills: 0 | Status: DISCONTINUED | OUTPATIENT
Start: 2020-09-01 | End: 2020-09-02

## 2020-09-01 RX ORDER — MAGNESIUM HYDROXIDE 400 MG/1
30 TABLET, CHEWABLE ORAL
Refills: 0 | Status: DISCONTINUED | OUTPATIENT
Start: 2020-09-01 | End: 2020-09-02

## 2020-09-01 RX ORDER — ACETAMINOPHEN 500 MG
3 TABLET ORAL
Qty: 0 | Refills: 0 | DISCHARGE
Start: 2020-09-01

## 2020-09-01 RX ORDER — DIPHENHYDRAMINE HCL 50 MG
25 CAPSULE ORAL EVERY 6 HOURS
Refills: 0 | Status: DISCONTINUED | OUTPATIENT
Start: 2020-09-01 | End: 2020-09-02

## 2020-09-01 RX ORDER — HYDROCORTISONE 1 %
1 OINTMENT (GRAM) TOPICAL EVERY 6 HOURS
Refills: 0 | Status: DISCONTINUED | OUTPATIENT
Start: 2020-09-01 | End: 2020-09-02

## 2020-09-01 RX ORDER — SENNA PLUS 8.6 MG/1
2 TABLET ORAL AT BEDTIME
Refills: 0 | Status: DISCONTINUED | OUTPATIENT
Start: 2020-09-01 | End: 2020-09-02

## 2020-09-01 RX ORDER — LANOLIN
1 OINTMENT (GRAM) TOPICAL EVERY 6 HOURS
Refills: 0 | Status: DISCONTINUED | OUTPATIENT
Start: 2020-09-01 | End: 2020-09-02

## 2020-09-01 RX ORDER — BENZOCAINE 10 %
1 GEL (GRAM) MUCOUS MEMBRANE EVERY 6 HOURS
Refills: 0 | Status: DISCONTINUED | OUTPATIENT
Start: 2020-09-01 | End: 2020-09-02

## 2020-09-01 RX ORDER — AER TRAVELER 0.5 G/1
1 SOLUTION RECTAL; TOPICAL EVERY 4 HOURS
Refills: 0 | Status: DISCONTINUED | OUTPATIENT
Start: 2020-09-01 | End: 2020-09-02

## 2020-09-01 RX ORDER — ACETAMINOPHEN 500 MG
975 TABLET ORAL
Refills: 0 | Status: DISCONTINUED | OUTPATIENT
Start: 2020-09-01 | End: 2020-09-02

## 2020-09-01 RX ORDER — ASCORBIC ACID 60 MG
500 TABLET,CHEWABLE ORAL DAILY
Refills: 0 | Status: DISCONTINUED | OUTPATIENT
Start: 2020-09-01 | End: 2020-09-02

## 2020-09-01 RX ORDER — IBUPROFEN 200 MG
600 TABLET ORAL EVERY 6 HOURS
Refills: 0 | Status: COMPLETED | OUTPATIENT
Start: 2020-09-01 | End: 2021-07-31

## 2020-09-01 RX ORDER — OXYCODONE HYDROCHLORIDE 5 MG/1
5 TABLET ORAL
Refills: 0 | Status: DISCONTINUED | OUTPATIENT
Start: 2020-09-01 | End: 2020-09-02

## 2020-09-01 RX ORDER — PRAMOXINE HYDROCHLORIDE 150 MG/15G
1 AEROSOL, FOAM RECTAL EVERY 4 HOURS
Refills: 0 | Status: DISCONTINUED | OUTPATIENT
Start: 2020-09-01 | End: 2020-09-02

## 2020-09-01 RX ORDER — TETANUS TOXOID, REDUCED DIPHTHERIA TOXOID AND ACELLULAR PERTUSSIS VACCINE, ADSORBED 5; 2.5; 8; 8; 2.5 [IU]/.5ML; [IU]/.5ML; UG/.5ML; UG/.5ML; UG/.5ML
0.5 SUSPENSION INTRAMUSCULAR ONCE
Refills: 0 | Status: DISCONTINUED | OUTPATIENT
Start: 2020-09-01 | End: 2020-09-02

## 2020-09-01 RX ORDER — OXYCODONE HYDROCHLORIDE 5 MG/1
5 TABLET ORAL ONCE
Refills: 0 | Status: DISCONTINUED | OUTPATIENT
Start: 2020-09-01 | End: 2020-09-02

## 2020-09-01 RX ORDER — IBUPROFEN 200 MG
1 TABLET ORAL
Qty: 0 | Refills: 0 | DISCHARGE
Start: 2020-09-01

## 2020-09-01 RX ORDER — DIBUCAINE 1 %
1 OINTMENT (GRAM) RECTAL EVERY 6 HOURS
Refills: 0 | Status: DISCONTINUED | OUTPATIENT
Start: 2020-09-01 | End: 2020-09-02

## 2020-09-01 RX ORDER — IBUPROFEN 200 MG
600 TABLET ORAL EVERY 6 HOURS
Refills: 0 | Status: DISCONTINUED | OUTPATIENT
Start: 2020-09-01 | End: 2020-09-02

## 2020-09-01 RX ORDER — OXYTOCIN 10 UNIT/ML
333.33 VIAL (ML) INJECTION
Qty: 20 | Refills: 0 | Status: DISCONTINUED | OUTPATIENT
Start: 2020-09-01 | End: 2020-09-01

## 2020-09-01 RX ORDER — FERROUS SULFATE 325(65) MG
325 TABLET ORAL THREE TIMES A DAY
Refills: 0 | Status: DISCONTINUED | OUTPATIENT
Start: 2020-09-01 | End: 2020-09-02

## 2020-09-01 RX ADMIN — SODIUM CHLORIDE 3 MILLILITER(S): 9 INJECTION INTRAMUSCULAR; INTRAVENOUS; SUBCUTANEOUS at 22:07

## 2020-09-01 RX ADMIN — Medication 600 MILLIGRAM(S): at 12:00

## 2020-09-01 RX ADMIN — Medication 600 MILLIGRAM(S): at 17:29

## 2020-09-01 RX ADMIN — Medication 325 MILLIGRAM(S): at 22:11

## 2020-09-01 RX ADMIN — Medication 500 MILLIGRAM(S): at 11:18

## 2020-09-01 RX ADMIN — Medication 975 MILLIGRAM(S): at 21:30

## 2020-09-01 RX ADMIN — Medication 1000 MILLIUNIT(S)/MIN: at 03:32

## 2020-09-01 RX ADMIN — Medication 325 MILLIGRAM(S): at 07:12

## 2020-09-01 RX ADMIN — Medication 975 MILLIGRAM(S): at 20:24

## 2020-09-01 RX ADMIN — SODIUM CHLORIDE 3 MILLILITER(S): 9 INJECTION INTRAMUSCULAR; INTRAVENOUS; SUBCUTANEOUS at 17:28

## 2020-09-01 RX ADMIN — SODIUM CHLORIDE 1000 MILLILITER(S): 9 INJECTION, SOLUTION INTRAVENOUS at 00:00

## 2020-09-01 RX ADMIN — Medication 600 MILLIGRAM(S): at 18:00

## 2020-09-01 RX ADMIN — Medication 975 MILLIGRAM(S): at 07:13

## 2020-09-01 RX ADMIN — Medication 600 MILLIGRAM(S): at 11:18

## 2020-09-01 NOTE — PROVIDER CONTACT NOTE (OTHER) - RECOMMENDATIONS
provider assessment Mastoid Interpolation Flap Text: A decision was made to reconstruct the defect utilizing an interpolation axial flap and a staged reconstruction.  A telfa template was made of the defect.  This telfa template was then used to outline the mastoid interpolation flap.  The donor area for the pedicle flap was then injected with anesthesia.  The flap was excised through the skin and subcutaneous tissue down to the layer of the underlying musculature.  The pedicle flap was carefully excised within this deep plane to maintain its blood supply.  The edges of the donor site were undermined.   The donor site was closed in a primary fashion.  The pedicle was then rotated into position and sutured.  Once the tube was sutured into place, adequate blood supply was confirmed with blanching and refill.  The pedicle was then wrapped with xeroform gauze and dressed appropriately with a telfa and gauze bandage to ensure continued blood supply and protect the attached pedicle.

## 2020-09-01 NOTE — DISCHARGE NOTE OB - PATIENT PORTAL LINK FT
You can access the FollowMyHealth Patient Portal offered by [facility] by registering at the following website: [email or web address]. By joining [facility]’s FollowMyHealth portal, you will also be able to view your health information using other applications (apps) compatible with our system.

## 2020-09-01 NOTE — CHART NOTE - NSCHARTNOTEFT_GEN_A_CORE
R1 Labor Note    S: Patient evaluated at bedside for cervical change.     O:  T(C): 36.9 (20 @ 23:36), Max: 36.9 (20 @ 22:11)  HR: 96 (20 @ 00:20) (49 - 96)  BP: 122/60 (20 @ 23:36) (115/56 - 122/60)  RR: 18 (20 @ 23:36) (16 - 18)  SpO2: 100% (20 @ 00:20) (91% - 100%)    SVE: 5/80/-2  EFM: , moderate variability, + accels, no decels   [place]:  q 5-6 minutes    A/P 24y P1 @ 38wks 4 day   -Expectant management   -Analgesia: Epidural to be called for   -Anticipate     d/w Dr. Daylin Damian PGY-1

## 2020-09-01 NOTE — OB PROVIDER DELIVERY SUMMARY - NSPROVIDERDELIVERYNOTE_OBGYN_ALL_OB_FT
Spontaneous vaginal delivery of liveborn infant from OA position. Head, shoulders, and body delivered easily. Infant was suctioned. No mec. Delayed cord clamping and infant was passed to mother. Cord clamped and cut. Placenta delivered intact with a 3 vessel cord. Fundal massage was given and uterine fundus was found to be firm. Vaginal exam revealed an intact cervix, vaginal walls and sulci. Patient had a left periurethral laceration that was repaired with 3-0 chromic suture. Excellent hemostasis was noted. Patient was stable and went to recovery. Count was correct x 2.     Annelise Damian, PGY-1

## 2020-09-01 NOTE — DISCHARGE NOTE OB - MEDICATION SUMMARY - MEDICATIONS TO TAKE
I will START or STAY ON the medications listed below when I get home from the hospital:    acetaminophen 325 mg oral tablet  -- 3 tab(s) by mouth   -- Indication: For     ibuprofen 600 mg oral tablet  -- 1 tab(s) by mouth every 6 hours  -- Indication: For     Prenatal Multivitamins with Folic Acid 1 mg oral tablet  -- 1 tab(s) by mouth once a day  -- Indication: For  I will START or STAY ON the medications listed below when I get home from the hospital:    acetaminophen 325 mg oral tablet  -- 3 tab(s) by mouth   -- Indication: For pain, as needed    ibuprofen 600 mg oral tablet  -- 1 tab(s) by mouth every 6 hours  -- Indication: For pain, as needed    Prenatal Multivitamins with Folic Acid 1 mg oral tablet  -- 1 tab(s) by mouth once a day  -- Indication: For

## 2020-09-01 NOTE — CHART NOTE - NSCHARTNOTEFT_GEN_A_CORE
OB Attending Labor Note    Pt seen and examined.  AROM (clear fluid)    T(C): 36.9 (08-31-20 @ 23:36), Max: 36.9 (08-31-20 @ 22:11)  HR: 80 (09-01-20 @ 02:09) (49 - 96)  BP: 109/54 (09-01-20 @ 02:09) (102/55 - 123/53)  RR: 18 (08-31-20 @ 23:36) (16 - 18)  SpO2: 100% (09-01-20 @ 02:05) (88% - 100%)    FHT: Cat I tracing  [place]: q 3 mins  VE: 7/80/-2    cont toco/EFM  expectant mgmt    Chad Avelar MD

## 2020-09-01 NOTE — DISCHARGE NOTE OB - MATERIALS PROVIDED
Last office visit:   9/21/16     Next office visit:  2/1/17     Surgery:  ACDF C6-7 with interbody cage, demineralized bone matrix and anterior plating/[facility] Dr Damico/ 1/2/17     Date last filled:  Percocet 1/3/17    Plan: refill, Script(s) is available at [facility] [employer] for .            
Patient picked up: prescription.   Identity was verified: Yes.     
oxyCODONE/APAP (PERCOCET) 5-325 MG per tablet    Patient to  RX(s) at [facility] on Wednesday, 1-18-17.    
Guide to Postpartum Care/[facility] Hearing Screen Program/Discharge Medication Information for Patients and Families Pocket Guide/[facility]  Screening Program/[place]  Immunization Record/Bottle Feeding Log/Tdap Vaccination (VIS Pub Date: 2012)/Vaccinations/Breastfeeding Guide and Packet/Birth Certificate Instructions/Breastfeeding Log/Back To Sleep Handout/Breastfeeding Mother’s Support Group Information/Shaken Baby Prevention Handout

## 2020-09-01 NOTE — DISCHARGE NOTE OB - CARE PROVIDER_API CALL
Chente Rocha  MATERNAL/FETAL MEDICINE  [address] [place] [address]  Phone: [phone]  Fax: [phone]  Follow Up Time:

## 2020-09-01 NOTE — CHART NOTE - NSCHARTNOTEFT_GEN_A_CORE
OB Attending Labor Note    Pt seen and examined    T(C): 36.9 (08-31-20 @ 23:36), Max: 36.9 (08-31-20 @ 22:11)  HR: 83 (09-01-20 @ 02:15) (49 - 96)  BP: 109/54 (09-01-20 @ 02:09) (102/55 - 123/53)  RR: 18 (08-31-20 @ 23:36) (16 - 18)  SpO2: 100% (09-01-20 @ 02:15) (88% - 100%)    FHT: Cat I tracing  [place]: q2 mins  VE: 9/100/0    cont toco/EFM  expectant mgmt    Chad Avelar MD

## 2020-09-01 NOTE — DISCHARGE NOTE OB - CARE PLAN
[Work-up necessary to assess local, regional or metastatic recurrence] : Work-up necessary to assess local, regional or metastatic recurrence Principal Discharge DX:	 (normal spontaneous vaginal delivery)  Goal:	recovery  Assessment and plan of treatment:	OOB, reg diet, analgesia PRN, pelvic rest

## 2020-09-01 NOTE — OB RN DELIVERY SUMMARY - NS_SEPSISRSKCALC_OBGYN_ALL_OB_FT
EOS calculated successfully. EOS Risk Factor: 0.5/1000 live births ([facility] national incidence); GA=38w5d; Temp=98.4; ROM=0.5; GBS='Negative'; Antibiotics='No antibiotics or any antibiotics < 2 hrs prior to birth'

## 2020-09-01 NOTE — CHART NOTE - NSCHARTNOTEFT_GEN_A_CORE
R2 Post partum Note    S: Called to evaluate patient after passage of large clot. Patient is s/p  R2 Post partum Note    S: Called to evaluate patient after passage of large clot. Patient is s/p  at 230am. She endorses some mild light headedness. Denies dizziness, chest pain, SOB, nausea, vomiting. She is not yet OOB. Has not yet attempted to eat or drink. Of note, patient has a history of PPH in a prior delivery.    O:  VS  T(C): 37.4 (20 @ 02:53)  HR: 57 (20 @ 05:08)  BP: 119/55 (20 @ 05:08)  RR: 18 (20 @ 03:53)  SpO2: 99% (20 @ 05:05)    Gen: in NAD, sitting up nursing in bed  CV: RR  Chest: CTAB  Abd: soft, nontender, nondistended, fundus firm at umbilicus,   Gu: Minimal amount of lochia with fundal palpation, no expression of clots.   Ext: no edema b/l, non tender    A/P: 23yo PPD0 s/p  at 230a. The patient endorses some mild lightheadedness with recent passage of large clot. Vaginal bleeding currently wnl. VSS.     -Encourage PO hydration   -Continue to monitor lochia  -Continue routine post partum care    D/w Dr. Trye RFrankel PGY2

## 2020-09-02 VITALS
SYSTOLIC BLOOD PRESSURE: 122 MMHG | DIASTOLIC BLOOD PRESSURE: 65 MMHG | RESPIRATION RATE: 17 BRPM | OXYGEN SATURATION: 100 % | TEMPERATURE: 98 F | HEART RATE: 65 BPM

## 2020-09-02 LAB — T PALLIDUM AB TITR SER: NEGATIVE — SIGNIFICANT CHANGE UP

## 2020-09-02 RX ADMIN — Medication 325 MILLIGRAM(S): at 05:27

## 2020-09-02 RX ADMIN — Medication 600 MILLIGRAM(S): at 07:00

## 2020-09-02 RX ADMIN — Medication 600 MILLIGRAM(S): at 05:27

## 2020-09-05 ENCOUNTER — EMERGENCY (EMERGENCY)
Facility: HOSPITAL | Age: 25
LOS: 1 days | Discharge: HOME | End: 2020-09-05
Attending: EMERGENCY MEDICINE | Admitting: EMERGENCY MEDICINE
Payer: MEDICAID

## 2020-09-05 VITALS
HEIGHT: 63 IN | OXYGEN SATURATION: 100 % | RESPIRATION RATE: 16 BRPM | TEMPERATURE: 99 F | SYSTOLIC BLOOD PRESSURE: 119 MMHG | DIASTOLIC BLOOD PRESSURE: 66 MMHG | HEART RATE: 80 BPM

## 2020-09-05 PROCEDURE — 99284 EMERGENCY DEPT VISIT MOD MDM: CPT

## 2020-09-05 NOTE — ED ADULT NURSE NOTE - OBJECTIVE STATEMENT
Pt received to room 8 complaining of headache since Wednesday. AxOx4 and ambulatory at baseline. Pt states she gave birth on 9/1 (second pregnancy) and ever since being discharged on Wednesday has had constant severe headache "all over" her head. Pt states bending or looking down makes the headache worse, states she has not been sleeping well. Pt appears in NAD at this time, respirations even and unlabored, NSR on cardiac monitor. Swelling noted to b/l LE, worse on R side. Pt endorses pain underneath R knee upon touch. Pt denies dizziness, chest pain, SOB, nausea, vomiting, diarrhea, fever, chills and cough. Pt denies any PMH at this time. MD at bedside for eval, awaiting MD orders. Will continue to monitor.

## 2020-09-05 NOTE — ED ADULT TRIAGE NOTE - CHIEF COMPLAINT QUOTE
S/p vaginal delivery with epidural on 9/1, c/o headache ever since giving birth. Also endorses right leg pain and swelling.

## 2020-09-06 VITALS
SYSTOLIC BLOOD PRESSURE: 121 MMHG | RESPIRATION RATE: 16 BRPM | DIASTOLIC BLOOD PRESSURE: 57 MMHG | OXYGEN SATURATION: 100 % | TEMPERATURE: 99 F | HEART RATE: 68 BPM

## 2020-09-06 LAB
ALBUMIN SERPL ELPH-MCNC: 3.7 G/DL — SIGNIFICANT CHANGE UP (ref 3.3–5)
ALP SERPL-CCNC: 104 U/L — SIGNIFICANT CHANGE UP (ref 40–120)
ALT FLD-CCNC: 31 U/L — SIGNIFICANT CHANGE UP (ref 4–33)
ANION GAP SERPL CALC-SCNC: 15 MMO/L — HIGH (ref 7–14)
APTT BLD: 26.5 SEC — LOW (ref 27–36.3)
AST SERPL-CCNC: 29 U/L — SIGNIFICANT CHANGE UP (ref 4–32)
BASOPHILS # BLD AUTO: 0.03 K/UL — SIGNIFICANT CHANGE UP (ref 0–0.2)
BASOPHILS NFR BLD AUTO: 0.3 % — SIGNIFICANT CHANGE UP (ref 0–2)
BILIRUB SERPL-MCNC: 0.5 MG/DL — SIGNIFICANT CHANGE UP (ref 0.2–1.2)
BLD GP AB SCN SERPL QL: NEGATIVE — SIGNIFICANT CHANGE UP
BUN SERPL-MCNC: 11 MG/DL — SIGNIFICANT CHANGE UP (ref 7–23)
CALCIUM SERPL-MCNC: 8.9 MG/DL — SIGNIFICANT CHANGE UP (ref 8.4–10.5)
CHLORIDE SERPL-SCNC: 104 MMOL/L — SIGNIFICANT CHANGE UP (ref 98–107)
CO2 SERPL-SCNC: 20 MMOL/L — LOW (ref 22–31)
CREAT SERPL-MCNC: 0.49 MG/DL — LOW (ref 0.5–1.3)
EOSINOPHIL # BLD AUTO: 0.17 K/UL — SIGNIFICANT CHANGE UP (ref 0–0.5)
EOSINOPHIL NFR BLD AUTO: 1.8 % — SIGNIFICANT CHANGE UP (ref 0–6)
GLUCOSE SERPL-MCNC: 90 MG/DL — SIGNIFICANT CHANGE UP (ref 70–99)
HCT VFR BLD CALC: 26.9 % — LOW (ref 34.5–45)
HGB BLD-MCNC: 7.6 G/DL — LOW (ref 11.5–15.5)
IMM GRANULOCYTES NFR BLD AUTO: 0.3 % — SIGNIFICANT CHANGE UP (ref 0–1.5)
INR BLD: 0.97 — SIGNIFICANT CHANGE UP (ref 0.88–1.16)
LDH SERPL L TO P-CCNC: 277 U/L — HIGH (ref 135–225)
LYMPHOCYTES # BLD AUTO: 1.77 K/UL — SIGNIFICANT CHANGE UP (ref 1–3.3)
LYMPHOCYTES # BLD AUTO: 19.3 % — SIGNIFICANT CHANGE UP (ref 13–44)
MCHC RBC-ENTMCNC: 19.9 PG — LOW (ref 27–34)
MCHC RBC-ENTMCNC: 28.3 % — LOW (ref 32–36)
MCV RBC AUTO: 70.4 FL — LOW (ref 80–100)
MONOCYTES # BLD AUTO: 0.42 K/UL — SIGNIFICANT CHANGE UP (ref 0–0.9)
MONOCYTES NFR BLD AUTO: 4.6 % — SIGNIFICANT CHANGE UP (ref 2–14)
NEUTROPHILS # BLD AUTO: 6.77 K/UL — SIGNIFICANT CHANGE UP (ref 1.8–7.4)
NEUTROPHILS NFR BLD AUTO: 73.7 % — SIGNIFICANT CHANGE UP (ref 43–77)
NRBC # FLD: 0 K/UL — SIGNIFICANT CHANGE UP (ref 0–0)
PLATELET # BLD AUTO: 279 K/UL — SIGNIFICANT CHANGE UP (ref 150–400)
PMV BLD: 9.7 FL — SIGNIFICANT CHANGE UP (ref 7–13)
POTASSIUM SERPL-MCNC: 3.7 MMOL/L — SIGNIFICANT CHANGE UP (ref 3.5–5.3)
POTASSIUM SERPL-SCNC: 3.7 MMOL/L — SIGNIFICANT CHANGE UP (ref 3.5–5.3)
PROT SERPL-MCNC: 6.8 G/DL — SIGNIFICANT CHANGE UP (ref 6–8.3)
PROTHROM AB SERPL-ACNC: 11.1 SEC — SIGNIFICANT CHANGE UP (ref 10.6–13.6)
RBC # BLD: 3.82 M/UL — SIGNIFICANT CHANGE UP (ref 3.8–5.2)
RBC # FLD: 19.9 % — HIGH (ref 10.3–14.5)
RH IG SCN BLD-IMP: POSITIVE — SIGNIFICANT CHANGE UP
SODIUM SERPL-SCNC: 139 MMOL/L — SIGNIFICANT CHANGE UP (ref 135–145)
WBC # BLD: 9.19 K/UL — SIGNIFICANT CHANGE UP (ref 3.8–10.5)
WBC # FLD AUTO: 9.19 K/UL — SIGNIFICANT CHANGE UP (ref 3.8–10.5)

## 2020-09-06 PROCEDURE — 93971 EXTREMITY STUDY: CPT | Mod: 26,LT

## 2020-09-06 RX ORDER — KETOROLAC TROMETHAMINE 30 MG/ML
15 SYRINGE (ML) INJECTION ONCE
Refills: 0 | Status: DISCONTINUED | OUTPATIENT
Start: 2020-09-06 | End: 2020-09-06

## 2020-09-06 RX ORDER — MAGNESIUM SULFATE 500 MG/ML
1 VIAL (ML) INJECTION ONCE
Refills: 0 | Status: COMPLETED | OUTPATIENT
Start: 2020-09-06 | End: 2020-09-06

## 2020-09-06 RX ORDER — ACETAMINOPHEN 500 MG
650 TABLET ORAL ONCE
Refills: 0 | Status: COMPLETED | OUTPATIENT
Start: 2020-09-06 | End: 2020-09-06

## 2020-09-06 RX ORDER — METOCLOPRAMIDE HCL 10 MG
10 TABLET ORAL ONCE
Refills: 0 | Status: COMPLETED | OUTPATIENT
Start: 2020-09-06 | End: 2020-09-06

## 2020-09-06 RX ORDER — SODIUM CHLORIDE 9 MG/ML
1000 INJECTION INTRAMUSCULAR; INTRAVENOUS; SUBCUTANEOUS ONCE
Refills: 0 | Status: COMPLETED | OUTPATIENT
Start: 2020-09-06 | End: 2020-09-06

## 2020-09-06 RX ADMIN — Medication 10 MILLIGRAM(S): at 00:42

## 2020-09-06 RX ADMIN — Medication 15 MILLIGRAM(S): at 01:56

## 2020-09-06 RX ADMIN — Medication 100 GRAM(S): at 01:56

## 2020-09-06 RX ADMIN — Medication 650 MILLIGRAM(S): at 00:42

## 2020-09-06 RX ADMIN — SODIUM CHLORIDE 1000 MILLILITER(S): 9 INJECTION INTRAMUSCULAR; INTRAVENOUS; SUBCUTANEOUS at 00:42

## 2020-09-06 RX ADMIN — Medication 15 MILLIGRAM(S): at 02:15

## 2020-09-06 NOTE — ED PROVIDER NOTE - PROGRESS NOTE DETAILS
Oc Phillip, PGY-1- discussed results of work up with patient. She states the headache feels better than before. US negative for signs of DVT. Will d/c patient home. Advised adequate hydration and sleep. Strict return precautions given. All questions answered regarding plan

## 2020-09-06 NOTE — ED PROVIDER NOTE - SECONDARY DIAGNOSIS.
Addended by: KAY BAH on: 7/22/2020 12:58 PM     Modules accepted: Orders     Pain of right lower extremity

## 2020-09-06 NOTE — ED PROVIDER NOTE - CARE PLAN
Principal Discharge DX:	Acute nonintractable headache, unspecified headache type  Secondary Diagnosis:	Pain of right lower extremity

## 2020-09-06 NOTE — ED PROVIDER NOTE - OBJECTIVE STATEMENT
25 y/o F  s/p FT/ on , no complications here with headache x 4 days.  Pt reports diffuse constant headache, slow onset after leaving hospital.  No associated fever, chills, vision changes, photophobia, n/v, weakness, numbness, tingling.  Pt took tylenol today without relief.  Pt also c/o R leg swelling since delivery.  No pain or rash.

## 2020-09-06 NOTE — ED PROVIDER NOTE - CLINICAL SUMMARY MEDICAL DECISION MAKING FREE TEXT BOX
23 y/o F  6 days post-partum (FT/) here with headache.  Normal exam, nonfocal neuro.  BP normal.  Likely tension vs spinal/post-epidural ha - will obtain labs given post partum status, pain meds, ivfs, reassess.

## 2020-09-06 NOTE — ED PROVIDER NOTE - NSFOLLOWUPINSTRUCTIONS_ED_ALL_ED_FT
1) Follow up with your doctor within 1-3 days for evaluation of the headache and leg pain   2) Return to the ED immediately for new or worsening symptoms severe pain, fever, chest pain, blurry vision, shortness of breath  3) Please continue to take any home medications as prescribed  4) Your test results from your ED visit were discussed with you prior to discharge  5) You were provided with a copy of your test results  6) You can take Tylenol or Motrin as needed for the headache

## 2020-09-06 NOTE — ED ADULT NURSE REASSESSMENT NOTE - NS ED NURSE REASSESS COMMENT FT1
BREAK RN; Pt. A&OX4, NSR on cardiac monitor. Respirations appear even and unlabored. Pt. denies any chest pain, SOB, n/v. Awaiting further orders. Will continue to monitor.

## 2020-09-06 NOTE — ED PROVIDER NOTE - CHIEF COMPLAINT
Food Choices to Help Relieve Diarrhea, Adult  When you have diarrhea, the foods you eat and your eating habits are very important. Choosing the right foods and drinks can help:  · Relieve diarrhea.  · Replace lost fluids and nutrients.  · Prevent dehydration.    What general guidelines should I follow?  Relieving diarrhea  · Choose foods with less than 2 g or .07 oz. of fiber per serving.  · Limit fats to less than 8 tsp (38 g or 1.34 oz.) a day.  · Avoid the following:  ? Foods and beverages sweetened with high-fructose corn syrup, honey, or sugar alcohols such as xylitol, sorbitol, and mannitol.  ? Foods that contain a lot of fat or sugar.  ? Fried, greasy, or spicy foods.  ? High-fiber grains, breads, and cereals.  ? Raw fruits and vegetables.  · Eat foods that are rich in probiotics. These foods include dairy products such as yogurt and fermented milk products. They help increase healthy bacteria in the stomach and intestines (gastrointestinal tract, or GI tract).  · If you have lactose intolerance, avoid dairy products. These may make your diarrhea worse.  · Take medicine to help stop diarrhea (antidiarrheal medicine) only as told by your health care provider.  Replacing nutrients  · Eat small meals or snacks every 3-4 hours.  · Eat bland foods, such as white rice, toast, or baked potato, until your diarrhea starts to get better. Gradually reintroduce nutrient-rich foods as tolerated or as told by your health care provider. This includes:  ? Well-cooked protein foods.  ? Peeled, seeded, and soft-cooked fruits and vegetables.  ? Low-fat dairy products.  · Take vitamin and mineral supplements as told by your health care provider.  Preventing dehydration    · Start by sipping water or a special solution to prevent dehydration (oral rehydration solution, ORS). Urine that is clear or pale yellow means that you are getting enough fluid.  · Try to drink at least 8-10 cups of fluid each day to help replace lost  The patient is a 24y Female complaining of fluids.  · You may add other liquids in addition to water, such as clear juice or decaffeinated sports drinks, as tolerated or as told by your health care provider.  · Avoid drinks with caffeine, such as coffee, tea, or soft drinks.  · Avoid alcohol.  What foods are recommended?  The items listed may not be a complete list. Talk with your health care provider about what dietary choices are best for you.  Grains  White rice. White, [place], or jerry breads (fresh or toasted), including plain rolls, buns, or bagels. White pasta. Saltine, soda, or aga crackers. Pretzels. Low-fiber cereal. Cooked cereals made with water (such as cornmeal, farina, or cream cereals). Plain muffins. Matzo. [place] toast. Zwieback.  Vegetables  Potatoes (without the skin). Most well-cooked and canned vegetables without skins or seeds. Tender lettuce.  Fruits  Apple sauce. Fruits canned in juice. Cooked apricots, cherries, grapefruit, peaches, pears, or plums. Fresh bananas and cantaloupe.  Meats and other protein foods  Baked or boiled chicken. Eggs. Tofu. Fish. Seafood. Smooth nut butters. Ground or well-cooked tender beef, ham, veal, lamb, pork, or poultry.  Dairy  Plain yogurt, kefir, and unsweetened liquid yogurt. Lactose-free milk, buttermilk, skim milk, or soy milk. Low-fat or nonfat hard cheese.  Beverages  Water. Low-calorie sports drinks. Fruit juices without pulp. Strained tomato and vegetable juices. Decaffeinated teas. Sugar-free beverages not sweetened with sugar alcohols. Oral rehydration solutions, if approved by your health care provider.  Seasoning and other foods  Bouillon, broth, or soups made from recommended foods.  What foods are not recommended?  The items listed may not be a complete list. Talk with your health care provider about what dietary choices are best for you.  Grains  Whole grain, whole wheat, bran, or rye breads, rolls, pastas, and crackers. Wild or brown rice. Whole grain or bran cereals. Barley. Oats and  oatmeal. Corn tortillas or taco shells. Granola. Popcorn.  Vegetables  Raw vegetables. Fried vegetables. Cabbage, broccoli, [place] sprouts, artichokes, baked beans, beet greens, corn, kale, legumes, peas, sweet potatoes, and yams. Potato skins. Cooked spinach and cabbage.  Fruits  Dried fruit, including raisins and dates. Raw fruits. Stewed or dried prunes. Canned fruits with syrup.  Meat and other protein foods  Fried or fatty meats. Deli meats. [place] nut butters. Nuts and seeds. Beans and lentils. [place]. Hot dogs. Sausage.  Dairy  High-fat cheeses. Whole milk, chocolate milk, and beverages made with milk, such as milk shakes. Half-and-half. Cream. sour cream. Ice cream.  Beverages  Caffeinated beverages (such as coffee, tea, soda, or energy drinks). Alcoholic beverages. Fruit juices with pulp. Prune juice. Soft drinks sweetened with high-fructose corn syrup or sugar alcohols. High-calorie sports drinks.  Fats and oils  Butter. Cream sauces. Margarine. Salad oils. Plain salad dressings. Olives. Avocados. Mayonnaise.  Sweets and desserts  Sweet rolls, doughnuts, and sweet breads. Sugar-free desserts sweetened with sugar alcohols such as xylitol and sorbitol.  Seasoning and other foods  Honey. Hot sauce. Chili powder. Gravy. Cream-based or milk-based soups. Pancakes and waffles.  Summary  · When you have diarrhea, the foods you eat and your eating habits are very important.  · Make sure you get at least 8-10 cups of fluid each day, or enough to keep your urine clear or pale yellow.  · Eat bland foods and gradually reintroduce healthy, nutrient-rich foods as tolerated, or as told by your health care provider.  · Avoid high-fiber, fried, greasy, or spicy foods.  This information is not intended to replace advice given to you by your health care provider. Make sure you discuss any questions you have with your health care provider.  Document Released: 03/09/2005 Document Revised: 12/15/2017 Document Reviewed:  12/15/2017  [employer] Interactive Patient Education © 2018 [employer]    Viral Gastroenteritis, Adult  Viral gastroenteritis is also known as the stomach flu. This condition is caused by various viruses. These viruses can be passed from person to person very easily (are very contagious). This condition may affect your stomach, small intestine, and large intestine. It can cause sudden watery diarrhea, fever, and vomiting.  Diarrhea and vomiting can make you feel weak and cause you to become dehydrated. You may not be able to keep fluids down. Dehydration can make you tired and thirsty, cause you to have a dry mouth, and decrease how often you urinate. Older adults and people with other diseases or a weak immune system are at higher risk for dehydration.  It is important to replace the fluids that you lose from diarrhea and vomiting. If you become severely dehydrated, you may need to get fluids through an IV tube.  What are the causes?  Gastroenteritis is caused by various viruses, including rotavirus and norovirus. Norovirus is the most common cause in adults.  You can get sick by eating food, drinking water, or touching a surface contaminated with one of these viruses. You can also get sick from sharing utensils or other personal items with an infected person.  What increases the risk?  This condition is more likely to develop in people:  · Who have a weak defense system (immune system).  · Who live with one or more children who are younger than 2 years old.  · Who live in a nursing home.  · Who go on cruise ships.    What are the signs or symptoms?  Symptoms of this condition start suddenly 1-2 days after exposure to a virus. Symptoms may last a few days or as long as a week. The most common symptoms are watery diarrhea and vomiting. Other symptoms include:  · Fever.  · Headache.  · Fatigue.  · Pain in the abdomen.  · Chills.  · Weakness.  · Nausea.  · Muscle aches.  · Loss of appetite.    How is this  diagnosed?  This condition is diagnosed with a medical history and physical exam. You may also have a stool test to check for viruses or other infections.  How is this treated?  This condition typically goes away on its own. The focus of treatment is to restore lost fluids (rehydration). Your health care provider may recommend that you take an oral rehydration solution (ORS) to replace important salts and minerals (electrolytes) in your body. Severe cases of this condition may require giving fluids through an IV tube.  Treatment may also include medicine to help with your symptoms.  Follow these instructions at home:  Follow instructions from your health care provider about how to care for yourself at home.  Eating and drinking  Follow these recommendations as told by your health care provider:  · Take an ORS. This is a drink that is sold at pharmacies and retail stores.  · Drink clear fluids in small amounts as you are able. Clear fluids include water, ice chips, diluted fruit juice, and low-calorie sports drinks.  · Eat bland, easy-to-digest foods in small amounts as you are able. These foods include bananas, applesauce, rice, lean meats, toast, and crackers.  · Avoid fluids that contain a lot of sugar or caffeine, such as energy drinks, sports drinks, and soda.  · Avoid alcohol.  · Avoid spicy or fatty foods.    General instructions    · Drink enough fluid to keep your urine clear or pale yellow.  · Wash your hands often. If soap and water are not available, use hand .  · Make sure that all people in your household wash their hands well and often.  · Take over-the-counter and prescription medicines only as told by your health care provider.  · Rest at home while you recover.  · Watch your condition for any changes.  · Take a warm bath to relieve any burning or pain from frequent diarrhea episodes.  · Keep all follow-up visits as told by your health care provider. This is important.  Contact a health care  provider if:  · You cannot keep fluids down.  · Your symptoms get worse.  · You have new symptoms.  · You feel light-headed or dizzy.  · You have muscle cramps.  Get help right away if:  · You have chest pain.  · You feel extremely weak or you faint.  · You see blood in your vomit.  · Your vomit looks like coffee grounds.  · You have bloody or black stools or stools that look like tar.  · You have a severe headache, a stiff neck, or both.  · You have a rash.  · You have severe pain, cramping, or bloating in your abdomen.  · You have trouble breathing or you are breathing very quickly.  · Your heart is beating very quickly.  · Your skin feels cold and clammy.  · You feel confused.  · You have pain when you urinate.  · You have signs of dehydration, such as:  ? Dark urine, very little urine, or no urine.  ? Cracked lips.  ? Dry mouth.  ? Sunken eyes.  ? Sleepiness.  ? Weakness.  This information is not intended to replace advice given to you by your health care provider. Make sure you discuss any questions you have with your health care provider.  Document Released: 12/18/2006 Document Revised: 05/31/2017 Document Reviewed: 08/23/2016  [employer] Interactive Patient Education © 2018 [employer]

## 2020-09-06 NOTE — ED PROVIDER NOTE - MUSCULOSKELETAL, MLM
Spine appears normal, range of motion is not limited, no muscle or joint tenderness, mild R nonpitting pedal edema, no calf tenderness

## 2020-09-06 NOTE — ED PROVIDER NOTE - PATIENT PORTAL LINK FT
You can access the FollowMyHealth Patient Portal offered by [facility] by registering at the following website: [email or web address]. By joining [facility]’s FollowMyHealth portal, you will also be able to view your health information using other applications (apps) compatible with our system.

## 2020-09-08 ENCOUNTER — APPOINTMENT (OUTPATIENT)
Dept: OBGYN | Facility: CLINIC | Age: 25
End: 2020-09-08

## 2020-10-21 NOTE — OB RN TRIAGE NOTE - GRAVIDA, OB PROFILE
2 Helical Rim Advancement Flap Text: The defect edges were debeveled with a #15c blade scalpel.  Given the location of the defect and the proximity to free margins (helical rim) a double helical rim advancement flap was deemed most appropriate.  Using a sterile surgical marker, the appropriate advancement flaps were drawn incorporating the defect and placing the expected incisions between the helical rim and antihelix where possible.  The area thus outlined was incised through and through with a #15 scalpel blade.  With a skin hook and iris scissors, the flaps were gently and sharply undermined and freed up.

## 2021-03-16 ENCOUNTER — APPOINTMENT (OUTPATIENT)
Dept: OBGYN | Facility: CLINIC | Age: 26
End: 2021-03-16

## 2021-04-02 ENCOUNTER — APPOINTMENT (OUTPATIENT)
Dept: OBGYN | Facility: CLINIC | Age: 26
End: 2021-04-02

## 2021-04-12 ENCOUNTER — APPOINTMENT (OUTPATIENT)
Dept: OBGYN | Facility: CLINIC | Age: 26
End: 2021-04-12
Payer: MEDICAID

## 2021-04-12 PROCEDURE — 99072 ADDL SUPL MATRL&STAF TM PHE: CPT

## 2021-04-12 PROCEDURE — 36415 COLL VENOUS BLD VENIPUNCTURE: CPT

## 2021-04-12 PROCEDURE — 99395 PREV VISIT EST AGE 18-39: CPT

## 2021-05-11 ENCOUNTER — APPOINTMENT (OUTPATIENT)
Dept: OBGYN | Facility: CLINIC | Age: 26
End: 2021-05-11
Payer: MEDICAID

## 2021-05-11 PROCEDURE — 0502F SUBSEQUENT PRENATAL CARE: CPT

## 2021-06-08 ENCOUNTER — APPOINTMENT (OUTPATIENT)
Dept: ANTEPARTUM | Facility: CLINIC | Age: 26
End: 2021-06-08
Payer: MEDICAID

## 2021-06-08 ENCOUNTER — NON-APPOINTMENT (OUTPATIENT)
Age: 26
End: 2021-06-08

## 2021-06-08 PROCEDURE — 76811 OB US DETAILED SNGL FETUS: CPT

## 2021-06-24 NOTE — OB RN TRIAGE NOTE - NSLDARRIVAL_OBGYN_ALL_OB_END_DATE
Neilmed Saline Nasal Rinse  [email or web address]/directions-for-[email or web address]        Step 1  Please wash your hands. Fill the clean bottle with the designated volume of either distilled, micro-filtered (through 0.2 micron filter), commercially bottled or previously boiled and cooled down water. Always rinse your nasal passages with NeilMed® Sinus Rinse™ packets only. Our packets contain a mixture of USP grade sodium chloride and sodium bicarbonate. These ingredients are of the purest quality available to make the dry powder mixture. Rinsing your nasal passages with only plain water without our mixture will result in a severe burning sensation as the plain water is not physiologic for your nasal lining, even if it is appropriate for drinking. Additionally, for your safety, do not use tap or faucet water for dissolving the mixture unless it has been previously boiled for five minutes or more as boiling sterilizes the water. Other choices are distilled, micro-filtered (through 0.2 micron), commercially bottled or, as mentioned earlier, previously boiled water at lukewarm or body temperature. You can store boiled water in a clean container for seven days or more if refrigerated. Do not use non-chlorinated or non-ultra (0.2 micron) filtered well water unless it is boiled and then cooled to lukewarm or body temperature.  *You may warm the water in a microwave in increments of 5 to 10 seconds to avoid overheating the water, damaging the device or scalding your nasal passage.   Step 2  Cut the Sinus Rinse™ mixture packet at the corner and pour its contents into the bottle. Tighten the cap and tube on the bottle securely. Place one finger over the tip of the cap and shake the bottle gently to dissolve the mixture.   Step 3  Standing in front of a sink, bend forward to your comfort level and tilt your head down. Keeping your mouth open, without holding your breath, place the cap snugly against your nasal passage. SQUEEZE  BOTTLE GENTLY until the solution starts draining from the OPPOSITE nasal passage. Some may drain from your mouth. For a proper rinse, keep squeezing the bottle GENTLY until at least 1/4 to 1/2 (60 mL to 120 mL or 2 to 4 fl oz) of the bottle is used. Do not swallow the solution.   Step 4  Blow your nose very gently, without pinching nose completely to avoid pressure on eardrums. If tolerable, sniff in gently any residual solution remaining in the nasal passage once or twice, because this may clean out the posterior nasopharyngeal area, which is the area at the back of your nasal passage. At times, some solution will reach the back of your throat, so please spit it out. To help drain any residual solution, blow your nose gently while tilting your head forward and to the opposite side of the nasal passage you just rinsed.  Step 5  Now repeat steps 3 and 4 for your other nasal passage. Most users fi nd that rinsing twice a day is beneficial, similar to brushing your teeth. Many doctors recommend rinsing 3-4 times daily or for special circumstances, even rinsing up to 6 times a day is safe. Please follow your physician’s advice.       19-Aug-2020 15:58

## 2021-07-21 ENCOUNTER — APPOINTMENT (OUTPATIENT)
Dept: OBGYN | Facility: CLINIC | Age: 26
End: 2021-07-21
Payer: MEDICAID

## 2021-07-21 PROCEDURE — 0502F SUBSEQUENT PRENATAL CARE: CPT

## 2021-08-04 ENCOUNTER — APPOINTMENT (OUTPATIENT)
Dept: OBGYN | Facility: CLINIC | Age: 26
End: 2021-08-04
Payer: MEDICAID

## 2021-08-04 PROCEDURE — 0502F SUBSEQUENT PRENATAL CARE: CPT

## 2021-08-13 DIAGNOSIS — Z78.9 OTHER SPECIFIED HEALTH STATUS: ICD-10-CM

## 2021-08-17 ENCOUNTER — APPOINTMENT (OUTPATIENT)
Dept: ANTEPARTUM | Facility: CLINIC | Age: 26
End: 2021-08-17
Payer: MEDICAID

## 2021-08-17 ENCOUNTER — APPOINTMENT (OUTPATIENT)
Dept: OBGYN | Facility: CLINIC | Age: 26
End: 2021-08-17
Payer: MEDICAID

## 2021-08-17 PROCEDURE — 76816 OB US FOLLOW-UP PER FETUS: CPT

## 2021-08-17 PROCEDURE — 76819 FETAL BIOPHYS PROFIL W/O NST: CPT

## 2021-08-17 PROCEDURE — ZZZZZ: CPT

## 2021-09-09 ENCOUNTER — APPOINTMENT (OUTPATIENT)
Dept: OBGYN | Facility: CLINIC | Age: 26
End: 2021-09-09
Payer: MEDICAID

## 2021-09-09 VITALS
SYSTOLIC BLOOD PRESSURE: 112 MMHG | DIASTOLIC BLOOD PRESSURE: 64 MMHG | BODY MASS INDEX: 25.34 KG/M2 | WEIGHT: 143 LBS | HEIGHT: 63 IN

## 2021-09-09 PROCEDURE — 0502F SUBSEQUENT PRENATAL CARE: CPT

## 2021-09-24 ENCOUNTER — APPOINTMENT (OUTPATIENT)
Dept: OBGYN | Facility: CLINIC | Age: 26
End: 2021-09-24
Payer: MEDICAID

## 2021-09-24 ENCOUNTER — LABORATORY RESULT (OUTPATIENT)
Age: 26
End: 2021-09-24

## 2021-09-24 PROCEDURE — 0502F SUBSEQUENT PRENATAL CARE: CPT

## 2021-09-24 PROCEDURE — 36415 COLL VENOUS BLD VENIPUNCTURE: CPT

## 2021-09-25 LAB
BASOPHILS # BLD AUTO: 0.03 K/UL
BASOPHILS NFR BLD AUTO: 0.4 %
EOSINOPHIL # BLD AUTO: 0.03 K/UL
EOSINOPHIL NFR BLD AUTO: 0.4 %
HCT VFR BLD CALC: 31.8 %
HGB BLD-MCNC: 8.6 G/DL
HIV1+2 AB SPEC QL IA.RAPID: NONREACTIVE
IMM GRANULOCYTES NFR BLD AUTO: 0.3 %
LYMPHOCYTES # BLD AUTO: 1.53 K/UL
LYMPHOCYTES NFR BLD AUTO: 20.4 %
MAN DIFF?: NORMAL
MCHC RBC-ENTMCNC: 21.3 PG
MCHC RBC-ENTMCNC: 27 GM/DL
MCV RBC AUTO: 78.7 FL
MONOCYTES # BLD AUTO: 0.36 K/UL
MONOCYTES NFR BLD AUTO: 4.8 %
NEUTROPHILS # BLD AUTO: 5.54 K/UL
NEUTROPHILS NFR BLD AUTO: 73.7 %
PLATELET # BLD AUTO: 187 K/UL
RBC # BLD: 4.04 M/UL
RBC # FLD: 18.5 %
WBC # FLD AUTO: 7.51 K/UL

## 2021-10-01 ENCOUNTER — APPOINTMENT (OUTPATIENT)
Dept: OBGYN | Facility: CLINIC | Age: 26
End: 2021-10-01
Payer: MEDICAID

## 2021-10-01 VITALS — SYSTOLIC BLOOD PRESSURE: 111 MMHG | DIASTOLIC BLOOD PRESSURE: 66 MMHG | WEIGHT: 148 LBS | BODY MASS INDEX: 26.22 KG/M2

## 2021-10-01 DIAGNOSIS — Z3A.36 36 WEEKS GESTATION OF PREGNANCY: ICD-10-CM

## 2021-10-01 LAB — B-HEM STREP SPEC QL CULT: ABNORMAL

## 2021-10-01 PROCEDURE — 0502F SUBSEQUENT PRENATAL CARE: CPT

## 2021-10-04 ENCOUNTER — OUTPATIENT (OUTPATIENT)
Dept: INPATIENT UNIT | Facility: HOSPITAL | Age: 26
LOS: 1 days | Discharge: HOME | End: 2021-10-04
Payer: MEDICAID

## 2021-10-04 VITALS
DIASTOLIC BLOOD PRESSURE: 55 MMHG | RESPIRATION RATE: 16 BRPM | SYSTOLIC BLOOD PRESSURE: 112 MMHG | HEART RATE: 67 BPM | TEMPERATURE: 99 F

## 2021-10-04 VITALS — DIASTOLIC BLOOD PRESSURE: 57 MMHG | HEART RATE: 64 BPM | SYSTOLIC BLOOD PRESSURE: 106 MMHG

## 2021-10-04 DIAGNOSIS — Z3A.00 WEEKS OF GESTATION OF PREGNANCY NOT SPECIFIED: ICD-10-CM

## 2021-10-04 DIAGNOSIS — O26.899 OTHER SPECIFIED PREGNANCY RELATED CONDITIONS, UNSPECIFIED TRIMESTER: ICD-10-CM

## 2021-10-04 PROCEDURE — 76818 FETAL BIOPHYS PROFILE W/NST: CPT | Mod: 26

## 2021-10-04 PROCEDURE — 99214 OFFICE O/P EST MOD 30 MIN: CPT | Mod: 25

## 2021-10-04 NOTE — OB PROVIDER TRIAGE NOTE - NSOBPROVIDERNOTE_OBGYN_ALL_OB_FT
25y  at 37.5w with no evidence of labor  GBS positive    Patient to be discharged home.  Instructed patient to return with decreased/no fetal movement, leakage of fluid, vaginal bleeding, abdominal cramping/contractions.  Follow up with Dr. Rocha as scheduled.  Patient verbalizes understanding    d/w 25y  at 37.5w with no evidence of labor. Fetal surveillance reassuring.  GBS positive    Patient to be discharged home.  Instructed patient to return with decreased/no fetal movement, leakage of fluid, vaginal bleeding, abdominal cramping/contractions.  Follow up with Dr. Rocha as scheduled on Wednesday.  Patient verbalizes understanding    d/w Hossein CUNHA

## 2021-10-04 NOTE — OB PROVIDER TRIAGE NOTE - ADDITIONAL INSTRUCTIONS
Patient to be discharged home.  Instructed patient to return with decreased/no fetal movement, leakage of fluid, vaginal bleeding, abdominal cramping/contractions.  Follow up with Dr. Rocha as scheduled on Wednesday.

## 2021-10-04 NOTE — OB PROVIDER TRIAGE NOTE - NSHPPHYSICALEXAM_GEN_ALL_CORE
T(C): 37.0 (10-04-21 @ 19:56), Max: 37 (10-04-21 @ 19:43)  HR: 67 (10-04-21 @ 20:02) (67 - 67)  BP: 112/55 (10-04-21 @ 20:02) (112/55 - 112/55)  RR: 16 (10-04-21 @ 19:43) (16 - 16)  SpO2: --    General: A&Ox3, appropriate, NAD  Cardiac: Regular rate and rhythm  Resp: CTAB  Abd: Gravid, soft, nontender  SVE: 2/50/-3  EFM: 125, moderate variability, +accel, no decel  [place]: uterine irritability  TAS: T(C): 37.0 (10-04-21 @ 19:56), Max: 37 (10-04-21 @ 19:43)  HR: 67 (10-04-21 @ 20:02) (67 - 67)  BP: 112/55 (10-04-21 @ 20:02) (112/55 - 112/55)  RR: 16 (10-04-21 @ 19:43) (16 - 16)  SpO2: --    General: A&Ox3, appropriate, NAD  Cardiac: Regular rate and rhythm  Resp: CTAB  Abd: Gravid, soft, nontender  SVE: 2/50/-3  EFM: 125, moderate variability, +accel, no decel  [place]: uterine irritability  TAS: vtx, posterior placenta, ADRIANA 14.38, BPP 8/8

## 2021-10-04 NOTE — OB PROVIDER TRIAGE NOTE - NS_OBGYNHISTORY_OBGYN_ALL_OB_FT
10/25/2019 FT KENIA HARLEY, 6#5  2020 FT KENIA HARLEY, 7#    GYN hx: denies hx of ovarian cysts, fibroids, abnl pap or STI

## 2021-10-04 NOTE — OB PROVIDER TRIAGE NOTE - PLAN OF CARE
25y  at 37.5w with no evidence of labor  GBS positive    Patient to be discharged home.  Instructed patient to return with decreased/no fetal movement, leakage of fluid, vaginal bleeding, abdominal cramping/contractions.  Follow up with Dr. Rocha as scheduled on Wednesday.  Patient verbalizes understanding    d/w Hossein CUNHA

## 2021-10-04 NOTE — OB PROVIDER TRIAGE NOTE - HISTORY OF PRESENT ILLNESS
PNC: Josefina CUNHA    This is a 26yo  at 37.5w (EDC 10/20/2021) presents to triage with c/o contractions. Patient reports contractions "for a few days", but intensified since 17:00. Patient unable to quantify frequency. Appreciates good fetal movement, denies leakage of fluid or vaginal bleeding.  Denies hx of covid, covid exposures or covid s/s.    AP Complications:  GBS positive - PCN allergy for Vanco

## 2021-10-06 ENCOUNTER — APPOINTMENT (OUTPATIENT)
Dept: OBGYN | Facility: CLINIC | Age: 26
End: 2021-10-06
Payer: MEDICAID

## 2021-10-06 PROBLEM — Z78.9 OTHER SPECIFIED HEALTH STATUS: Chronic | Status: ACTIVE | Noted: 2021-10-04

## 2021-10-06 PROCEDURE — 0502F SUBSEQUENT PRENATAL CARE: CPT

## 2021-10-07 ENCOUNTER — INPATIENT (INPATIENT)
Facility: HOSPITAL | Age: 26
LOS: 1 days | Discharge: HOME | End: 2021-10-09
Attending: OBSTETRICS & GYNECOLOGY | Admitting: OBSTETRICS & GYNECOLOGY
Payer: MEDICAID

## 2021-10-07 VITALS
TEMPERATURE: 99 F | DIASTOLIC BLOOD PRESSURE: 59 MMHG | HEART RATE: 71 BPM | RESPIRATION RATE: 16 BRPM | SYSTOLIC BLOOD PRESSURE: 108 MMHG | WEIGHT: 145.06 LBS | HEIGHT: 63 IN

## 2021-10-07 DIAGNOSIS — Z34.90 ENCOUNTER FOR SUPERVISION OF NORMAL PREGNANCY, UNSPECIFIED, UNSPECIFIED TRIMESTER: ICD-10-CM

## 2021-10-07 DIAGNOSIS — Z3A.00 WEEKS OF GESTATION OF PREGNANCY NOT SPECIFIED: ICD-10-CM

## 2021-10-07 DIAGNOSIS — O26.899 OTHER SPECIFIED PREGNANCY RELATED CONDITIONS, UNSPECIFIED TRIMESTER: ICD-10-CM

## 2021-10-07 LAB
BASOPHILS # BLD AUTO: 0.02 K/UL — SIGNIFICANT CHANGE UP (ref 0–0.2)
BASOPHILS NFR BLD AUTO: 0.2 % — SIGNIFICANT CHANGE UP (ref 0–2)
BLD GP AB SCN SERPL QL: NEGATIVE — SIGNIFICANT CHANGE UP
EOSINOPHIL # BLD AUTO: 0.04 K/UL — SIGNIFICANT CHANGE UP (ref 0–0.5)
EOSINOPHIL NFR BLD AUTO: 0.5 % — SIGNIFICANT CHANGE UP (ref 0–6)
HCT VFR BLD CALC: 27.4 % — LOW (ref 34.5–45)
HGB BLD-MCNC: 8.2 G/DL — LOW (ref 11.5–15.5)
IANC: 6.02 K/UL — SIGNIFICANT CHANGE UP (ref 1.5–8.5)
IMM GRANULOCYTES NFR BLD AUTO: 0.4 % — SIGNIFICANT CHANGE UP (ref 0–1.5)
LYMPHOCYTES # BLD AUTO: 1.82 K/UL — SIGNIFICANT CHANGE UP (ref 1–3.3)
LYMPHOCYTES # BLD AUTO: 21.8 % — SIGNIFICANT CHANGE UP (ref 13–44)
MCHC RBC-ENTMCNC: 21.3 PG — LOW (ref 27–34)
MCHC RBC-ENTMCNC: 29.9 GM/DL — LOW (ref 32–36)
MCV RBC AUTO: 71.2 FL — LOW (ref 80–100)
MONOCYTES # BLD AUTO: 0.43 K/UL — SIGNIFICANT CHANGE UP (ref 0–0.9)
MONOCYTES NFR BLD AUTO: 5.1 % — SIGNIFICANT CHANGE UP (ref 2–14)
NEUTROPHILS # BLD AUTO: 6.02 K/UL — SIGNIFICANT CHANGE UP (ref 1.8–7.4)
NEUTROPHILS NFR BLD AUTO: 72 % — SIGNIFICANT CHANGE UP (ref 43–77)
NRBC # BLD: 0 /100 WBCS — SIGNIFICANT CHANGE UP
NRBC # FLD: 0 K/UL — SIGNIFICANT CHANGE UP
PLATELET # BLD AUTO: 198 K/UL — SIGNIFICANT CHANGE UP (ref 150–400)
RBC # BLD: 3.85 M/UL — SIGNIFICANT CHANGE UP (ref 3.8–5.2)
RBC # FLD: 17.7 % — HIGH (ref 10.3–14.5)
RH IG SCN BLD-IMP: POSITIVE — SIGNIFICANT CHANGE UP
WBC # BLD: 8.36 K/UL — SIGNIFICANT CHANGE UP (ref 3.8–10.5)
WBC # FLD AUTO: 8.36 K/UL — SIGNIFICANT CHANGE UP (ref 3.8–10.5)

## 2021-10-07 PROCEDURE — 59400 OBSTETRICAL CARE: CPT | Mod: U7

## 2021-10-07 RX ORDER — VANCOMYCIN HCL 1 G
VIAL (EA) INTRAVENOUS
Refills: 0 | Status: DISCONTINUED | OUTPATIENT
Start: 2021-10-07 | End: 2021-10-08

## 2021-10-07 RX ORDER — OXYTOCIN 10 UNIT/ML
333.33 VIAL (ML) INJECTION
Qty: 20 | Refills: 0 | Status: DISCONTINUED | OUTPATIENT
Start: 2021-10-07 | End: 2021-10-08

## 2021-10-07 RX ORDER — SODIUM CHLORIDE 9 MG/ML
1000 INJECTION, SOLUTION INTRAVENOUS
Refills: 0 | Status: DISCONTINUED | OUTPATIENT
Start: 2021-10-07 | End: 2021-10-08

## 2021-10-07 RX ORDER — INFLUENZA VIRUS VACCINE 15; 15; 15; 15 UG/.5ML; UG/.5ML; UG/.5ML; UG/.5ML
0.5 SUSPENSION INTRAMUSCULAR ONCE
Refills: 0 | Status: DISCONTINUED | OUTPATIENT
Start: 2021-10-07 | End: 2021-10-09

## 2021-10-07 RX ORDER — VANCOMYCIN HCL 1 G
1250 VIAL (EA) INTRAVENOUS EVERY 8 HOURS
Refills: 0 | Status: DISCONTINUED | OUTPATIENT
Start: 2021-10-08 | End: 2021-10-08

## 2021-10-07 RX ORDER — VANCOMYCIN HCL 1 G
1250 VIAL (EA) INTRAVENOUS ONCE
Refills: 0 | Status: COMPLETED | OUTPATIENT
Start: 2021-10-07 | End: 2021-10-07

## 2021-10-07 RX ADMIN — SODIUM CHLORIDE 125 MILLILITER(S): 9 INJECTION, SOLUTION INTRAVENOUS at 20:54

## 2021-10-07 RX ADMIN — Medication 166.67 MILLIGRAM(S): at 20:34

## 2021-10-07 NOTE — OB PROVIDER TRIAGE NOTE - NS_FHRACCEL_OBGYN_ALL_OB
Glasses Rx updated and given.
RTC 1 year\EXAM.
Stable.
The cataracts are mild and have minimal impact on vision at this time.
Present (15 x15 bpm)

## 2021-10-07 NOTE — OB PROVIDER TRIAGE NOTE - NSHPPHYSICALEXAM_GEN_ALL_CORE
abdomen: soft, nt on palp  SVE;2/60/-3, pt states was 2 cm in Dr davila's office this week  T(C): 37.0 (10-07-21 @ 17:06), Max: 37 (10-07-21 @ 16:48)  HR: 71 (10-07-21 @ 17:03) (71 - 71)  BP: 108/59 (10-07-21 @ 17:03) (108/59 - 108/59)  RR: 16 (10-07-21 @ 16:48) (16 - 16)  SpO2: -- abdomen: soft, nt on palp  SVE;2/60/-3, pt states was 2 cm in Dr davila's office this week  T(C): 37.0 (10-07-21 @ 17:06), Max: 37 (10-07-21 @ 16:48)  HR: 71 (10-07-21 @ 17:03) (71 - 71)  BP: 108/59 (10-07-21 @ 17:03) (108/59 - 108/59)  RR: 16 (10-07-21 @ 16:48) (16 - 16)  SpO2: --  TAS: BPP; 8/8 vertex posterior placenta ADRIANA: 17.26

## 2021-10-07 NOTE — OB PROVIDER H&P - ASSESSMENT
26 y/o  @ 38.1 wks gestation presents in early labor  cat 2 FHT  toco: irregular uterine contractions noted    plan of care d/w dr hawk  EFM tracing reviewed by Dr hawk  Admit to l&D  38.1 wks gestation category 2 FHT/ GBS- + / Vancomycin/ po cytotec   see admission orders

## 2021-10-07 NOTE — OB RN PATIENT PROFILE - PRO FEEDING PLAN INFANT OB
Addended by: BRUCE BOWLING on: 6/10/2019 09:29 AM     Modules accepted: Orders     initiation of breastfeeding/breast milk feeding

## 2021-10-07 NOTE — OB RN PATIENT PROFILE - NS PRO DEPRESSION SCREENING Y/N1
(12/1) Hgb 6.2, Hct 18.1, Cl 110, BUN 35, gluc 238, Ca 8.3, Alb 2.0, TBili 3.7, NH3  123 (12/1) HgbA1c 8.3% no

## 2021-10-07 NOTE — OB PROVIDER H&P - NSHPPHYSICALEXAM_GEN_ALL_CORE
abdomen: soft, nt on palp  SVE;2/60/-3, pt states was 2 cm in Dr davila's office this week  T(C): 37.0 (10-07-21 @ 17:06), Max: 37 (10-07-21 @ 16:48)  HR: 71 (10-07-21 @ 17:03) (71 - 71)  BP: 108/59 (10-07-21 @ 17:03) (108/59 - 108/59)  RR: 16 (10-07-21 @ 16:48) (16 - 16)  SpO2: --  TAS: BPP; 8/8 vertex posterior placenta ADRIANA: 17.26

## 2021-10-07 NOTE — OB PROVIDER TRIAGE NOTE - HISTORY OF PRESENT ILLNESS
26 y/o   @ 38.1 wks gestation presents with c/o painful uterine contractions every 5 minutes since 1400, reports her pain is 8/10 on pain scale and requires no pain meds at this time denies any VB or lof reports +FM denies any n/v/d denies any fever or chills ap care comp by :   GBS - + as per patient

## 2021-10-07 NOTE — OB PROVIDER TRIAGE NOTE - NSOBPROVIDERNOTE_OBGYN_ALL_OB_FT
26 y/o  @ 38.1 wks gestation presents in early labor 24 y/o  @ 38.1 wks gestation presents in early labor  cat 2 FHT  toco: irregular uterine contractions noted    plan of care d/w dr hawk  EFM tracing reviewed by Dr hawk  Admit to l&D  38.1 wks gestation category 2 FHT/ GBS- + / continuous EFM for possible IOL   see admission orders 26 y/o  @ 38.1 wks gestation presents in early labor  cat 2 FHT  toco: irregular uterine contractions noted    plan of care d/w dr hawk  EFM tracing reviewed by Dr hawk  Admit to l&D  38.1 wks gestation category 2 FHT/ GBS- + / Vancomycin/ po cytotec   see admission orders

## 2021-10-08 LAB
COVID-19 SPIKE DOMAIN AB INTERP: POSITIVE
COVID-19 SPIKE DOMAIN ANTIBODY RESULT: 27.9 U/ML — HIGH
HBV SURFACE AG SERPL QL IA: SIGNIFICANT CHANGE UP
SARS-COV-2 IGG+IGM SERPL QL IA: 27.9 U/ML — HIGH
SARS-COV-2 IGG+IGM SERPL QL IA: POSITIVE
SARS-COV-2 RNA SPEC QL NAA+PROBE: SIGNIFICANT CHANGE UP
T PALLIDUM AB TITR SER: NEGATIVE — SIGNIFICANT CHANGE UP

## 2021-10-08 RX ORDER — KETOROLAC TROMETHAMINE 30 MG/ML
30 SYRINGE (ML) INJECTION ONCE
Refills: 0 | Status: DISCONTINUED | OUTPATIENT
Start: 2021-10-08 | End: 2021-10-08

## 2021-10-08 RX ORDER — LANOLIN
1 OINTMENT (GRAM) TOPICAL EVERY 6 HOURS
Refills: 0 | Status: DISCONTINUED | OUTPATIENT
Start: 2021-10-08 | End: 2021-10-09

## 2021-10-08 RX ORDER — SIMETHICONE 80 MG/1
80 TABLET, CHEWABLE ORAL EVERY 4 HOURS
Refills: 0 | Status: DISCONTINUED | OUTPATIENT
Start: 2021-10-08 | End: 2021-10-09

## 2021-10-08 RX ORDER — OXYCODONE HYDROCHLORIDE 5 MG/1
5 TABLET ORAL
Refills: 0 | Status: DISCONTINUED | OUTPATIENT
Start: 2021-10-08 | End: 2021-10-09

## 2021-10-08 RX ORDER — TETANUS TOXOID, REDUCED DIPHTHERIA TOXOID AND ACELLULAR PERTUSSIS VACCINE, ADSORBED 5; 2.5; 8; 8; 2.5 [IU]/.5ML; [IU]/.5ML; UG/.5ML; UG/.5ML; UG/.5ML
0.5 SUSPENSION INTRAMUSCULAR ONCE
Refills: 0 | Status: DISCONTINUED | OUTPATIENT
Start: 2021-10-08 | End: 2021-10-09

## 2021-10-08 RX ORDER — PRAMOXINE HYDROCHLORIDE 150 MG/15G
1 AEROSOL, FOAM RECTAL EVERY 4 HOURS
Refills: 0 | Status: DISCONTINUED | OUTPATIENT
Start: 2021-10-08 | End: 2021-10-09

## 2021-10-08 RX ORDER — DIPHENHYDRAMINE HCL 50 MG
50 CAPSULE ORAL ONCE
Refills: 0 | Status: COMPLETED | OUTPATIENT
Start: 2021-10-08 | End: 2021-10-08

## 2021-10-08 RX ORDER — IBUPROFEN 200 MG
600 TABLET ORAL EVERY 6 HOURS
Refills: 0 | Status: COMPLETED | OUTPATIENT
Start: 2021-10-08 | End: 2022-09-06

## 2021-10-08 RX ORDER — VANCOMYCIN HCL 1 G
1250 VIAL (EA) INTRAVENOUS EVERY 8 HOURS
Refills: 0 | Status: DISCONTINUED | OUTPATIENT
Start: 2021-10-08 | End: 2021-10-08

## 2021-10-08 RX ORDER — AER TRAVELER 0.5 G/1
1 SOLUTION RECTAL; TOPICAL EVERY 4 HOURS
Refills: 0 | Status: DISCONTINUED | OUTPATIENT
Start: 2021-10-08 | End: 2021-10-09

## 2021-10-08 RX ORDER — IBUPROFEN 200 MG
600 TABLET ORAL EVERY 6 HOURS
Refills: 0 | Status: DISCONTINUED | OUTPATIENT
Start: 2021-10-08 | End: 2021-10-09

## 2021-10-08 RX ORDER — ACETAMINOPHEN 500 MG
975 TABLET ORAL
Refills: 0 | Status: DISCONTINUED | OUTPATIENT
Start: 2021-10-08 | End: 2021-10-09

## 2021-10-08 RX ORDER — ONDANSETRON 8 MG/1
4 TABLET, FILM COATED ORAL ONCE
Refills: 0 | Status: COMPLETED | OUTPATIENT
Start: 2021-10-08 | End: 2021-10-08

## 2021-10-08 RX ORDER — OXYCODONE HYDROCHLORIDE 5 MG/1
5 TABLET ORAL ONCE
Refills: 0 | Status: DISCONTINUED | OUTPATIENT
Start: 2021-10-08 | End: 2021-10-09

## 2021-10-08 RX ORDER — SODIUM CHLORIDE 9 MG/ML
3 INJECTION INTRAMUSCULAR; INTRAVENOUS; SUBCUTANEOUS EVERY 8 HOURS
Refills: 0 | Status: DISCONTINUED | OUTPATIENT
Start: 2021-10-08 | End: 2021-10-09

## 2021-10-08 RX ORDER — OXYTOCIN 10 UNIT/ML
333.33 VIAL (ML) INJECTION
Qty: 20 | Refills: 0 | Status: DISCONTINUED | OUTPATIENT
Start: 2021-10-08 | End: 2021-10-09

## 2021-10-08 RX ORDER — HYDROCORTISONE 1 %
1 OINTMENT (GRAM) TOPICAL EVERY 6 HOURS
Refills: 0 | Status: DISCONTINUED | OUTPATIENT
Start: 2021-10-08 | End: 2021-10-09

## 2021-10-08 RX ORDER — BENZOCAINE 10 %
1 GEL (GRAM) MUCOUS MEMBRANE EVERY 6 HOURS
Refills: 0 | Status: DISCONTINUED | OUTPATIENT
Start: 2021-10-08 | End: 2021-10-09

## 2021-10-08 RX ORDER — DIBUCAINE 1 %
1 OINTMENT (GRAM) RECTAL EVERY 6 HOURS
Refills: 0 | Status: DISCONTINUED | OUTPATIENT
Start: 2021-10-08 | End: 2021-10-09

## 2021-10-08 RX ORDER — DIPHENHYDRAMINE HCL 50 MG
25 CAPSULE ORAL EVERY 6 HOURS
Refills: 0 | Status: DISCONTINUED | OUTPATIENT
Start: 2021-10-08 | End: 2021-10-09

## 2021-10-08 RX ORDER — MAGNESIUM HYDROXIDE 400 MG/1
30 TABLET, CHEWABLE ORAL
Refills: 0 | Status: DISCONTINUED | OUTPATIENT
Start: 2021-10-08 | End: 2021-10-09

## 2021-10-08 RX ADMIN — Medication 83.33 MILLIGRAM(S): at 04:37

## 2021-10-08 RX ADMIN — ONDANSETRON 4 MILLIGRAM(S): 8 TABLET, FILM COATED ORAL at 02:35

## 2021-10-08 RX ADMIN — Medication 50 MILLIGRAM(S): at 04:04

## 2021-10-08 RX ADMIN — Medication 975 MILLIGRAM(S): at 21:31

## 2021-10-08 RX ADMIN — Medication 975 MILLIGRAM(S): at 22:01

## 2021-10-08 RX ADMIN — Medication 30 MILLIGRAM(S): at 10:39

## 2021-10-08 NOTE — CHART NOTE - NSCHARTNOTEFT_GEN_A_CORE
PA Note    feeling rectal pressure    VS  T(C): 36.7 (10-08-21 @ 02:13)  HR: 81 (10-08-21 @ 07:22)  BP: 105/- (10-08-21 @ 07:22)  RR: 14 (10-07-21 @ 20:39)  SpO2: 100% (10-08-21 @ 07:17)    140/mod vignesh/+accels/intermittent variable decels  [place] q 2-3min  8-9/100/0 intact    cont efm/toco   anticipated  dw Dr Josefina jaramillo pa

## 2021-10-08 NOTE — OB PROVIDER DELIVERY SUMMARY - NSSELHIDDEN_OBGYN_ALL_OB_FT
[NS_DeliveryAttending1_OBGYN_ALL_OB_FT:[id number]=],[NS_DeliveryAssist1_OBGYN_ALL_OB_FT:[id number]=]

## 2021-10-08 NOTE — OB PROVIDER DELIVERY SUMMARY - NSPROVIDERDELIVERYNOTE_OBGYN_ALL_OB_FT
of a viable female infant. Head, shoulders & body delivered easily. Infant handed to mother. Cord clamped x 2 & cut. Placenta delivered intact via gentle uterine massage. On inspection no lacerations were noted. Fundus firm on bimanual exam.     Attending Dr Rocha  Assistant FLORINDA Rehman

## 2021-10-08 NOTE — OB RN DELIVERY SUMMARY - NSSELHIDDEN_OBGYN_ALL_OB_FT
[NS_DeliveryAttending1_OBGYN_ALL_OB_FT:[id number]],[NS_DeliveryAssist1_OBGYN_ALL_OB_FT:[id number]=],[NS_DeliveryRN_OBGYN_ALL_OB_FT:[id number]=],[NS_CirculateRN2_OBGYN_ALL_OB_FT:[id number]]

## 2021-10-08 NOTE — OB NEONATOLOGY/PEDIATRICIAN DELIVERY SUMMARY - NSPEDSNEONOTESA_OBGYN_ALL_OB_FT
Called by Dr. Rocha to attend  c/s delivery due to NRFHT. Baby is  product of a 38.0 week gestation born to a G 3 P 2002   25 year old female   Maternal labs include Blood Type B+ , HIV neg, RPR NR , Hep B[ - ], GBS  + on 9/24/2021, Covid neg on 10/7/21. Maternal history is nonsignificant. Pregnancy was uncomplicated.   ROM PTD , approximately  0 hrs.  Resuscitation included: WDSS.  Apgars were: 9&9. EOS score 0.03. Admit to NBN.  Temperature prior to transfer 36.6 C.

## 2021-10-08 NOTE — OB RN DELIVERY SUMMARY - NS_SEPSISRSKCALC_OBGYN_ALL_OB_FT
EOS calculated successfully. EOS Risk Factor: 0.5/1000 live births ([facility] national incidence); GA=38w2d; Temp=98.6; ROM=0.117; GBS='Positive'; Antibiotics='No antibiotics or any antibiotics < 2 hrs prior to birth'

## 2021-10-09 ENCOUNTER — TRANSCRIPTION ENCOUNTER (OUTPATIENT)
Age: 26
End: 2021-10-09

## 2021-10-09 VITALS
SYSTOLIC BLOOD PRESSURE: 113 MMHG | OXYGEN SATURATION: 100 % | RESPIRATION RATE: 18 BRPM | DIASTOLIC BLOOD PRESSURE: 58 MMHG | TEMPERATURE: 99 F | HEART RATE: 63 BPM

## 2021-10-09 RX ORDER — IBUPROFEN 200 MG
1 TABLET ORAL
Qty: 0 | Refills: 0 | DISCHARGE
Start: 2021-10-09

## 2021-10-09 RX ORDER — ACETAMINOPHEN 500 MG
3 TABLET ORAL
Qty: 0 | Refills: 0 | DISCHARGE
Start: 2021-10-09

## 2021-10-09 RX ADMIN — Medication 600 MILLIGRAM(S): at 06:20

## 2021-10-09 RX ADMIN — Medication 1 TABLET(S): at 13:39

## 2021-10-09 RX ADMIN — Medication 600 MILLIGRAM(S): at 00:30

## 2021-10-09 RX ADMIN — SODIUM CHLORIDE 3 MILLILITER(S): 9 INJECTION INTRAMUSCULAR; INTRAVENOUS; SUBCUTANEOUS at 13:42

## 2021-10-09 RX ADMIN — Medication 600 MILLIGRAM(S): at 13:39

## 2021-10-09 RX ADMIN — Medication 600 MILLIGRAM(S): at 01:00

## 2021-10-09 RX ADMIN — SODIUM CHLORIDE 3 MILLILITER(S): 9 INJECTION INTRAMUSCULAR; INTRAVENOUS; SUBCUTANEOUS at 05:43

## 2021-10-09 RX ADMIN — Medication 600 MILLIGRAM(S): at 05:50

## 2021-10-09 RX ADMIN — Medication 600 MILLIGRAM(S): at 14:11

## 2021-10-09 NOTE — PROGRESS NOTE ADULT - SUBJECTIVE AND OBJECTIVE BOX
Anesthesia Post-op Note    POD#1 S/P C/S    Patient is doing well.  OOBAA. Tolerating clears.  Pain is tolerable.  No residual anesthetic issues or complications noted.   Anesthesia Post-op Note    POD#1 S/P vaginal delivery    Patient is doing well.  OOBAA. Tolerating clears.  Pain is tolerable.  No residual anesthetic issues or complications noted.

## 2021-10-09 NOTE — DISCHARGE NOTE OB - CARE PLAN
Principal Discharge DX:	Vaginal delivery  Assessment and plan of treatment:	normal postpartum course   1

## 2021-10-09 NOTE — DISCHARGE NOTE OB - HAS THE PATIENT RECEIVED THE INFLUENZA VACCINE THIS SEASON?
Patient c/o lower back pain had fall Wednesday  Patient using OTC tylenol/mortin alternating  Patient said no increase in pain from original fall, no numbness or tingling to arms or leg  Patient denied chest pain, SOB, nausea, vomiting, difficulty urinating  Patient denied dizziness, not light headed, no pain that radiates to neck, shoulders or jaw, arms  No difficulty moving legs feet or toes  Patient will monitor symptoms and continue tylenol/motrin PRN  If patient has any worsening symptoms would seek urgent care consult  no...

## 2021-10-09 NOTE — DISCHARGE NOTE OB - MEDICATION SUMMARY - MEDICATIONS TO TAKE
I will START or STAY ON the medications listed below when I get home from the hospital:    ibuprofen 600 mg oral tablet  -- 1 tab(s) by mouth every 6 hours  -- Indication: For Encounter for induction of labor    acetaminophen 325 mg oral tablet  -- 3 tab(s) by mouth   -- Indication: For Encounter for induction of labor    Prenatal Multivitamins with Folic Acid 1 mg oral tablet  -- 1 tab(s) by mouth once a day  -- Indication: For Encounter for induction of labor

## 2021-10-09 NOTE — DISCHARGE NOTE OB - PATIENT PORTAL LINK FT
You can access the FollowMyHealth Patient Portal offered by [facility] by registering at the following website: [email or web address]. By joining [employer]’s FollowMyHealth portal, you will also be able to view your health information using other applications (apps) compatible with our system.

## 2021-10-09 NOTE — DISCHARGE NOTE OB - CARE PROVIDER_API CALL
Chente Rocha)  MaternalFetal Medicine; Obstetrics and Gynecology  [address] [place] [address]  Phone: [phone]  Fax: [phone]  Follow Up Time:

## 2021-10-12 ENCOUNTER — APPOINTMENT (OUTPATIENT)
Dept: OBGYN | Facility: CLINIC | Age: 26
End: 2021-10-12

## 2021-11-23 ENCOUNTER — APPOINTMENT (OUTPATIENT)
Dept: OBGYN | Facility: CLINIC | Age: 26
End: 2021-11-23
Payer: MEDICAID

## 2021-11-23 VITALS — WEIGHT: 133 LBS | SYSTOLIC BLOOD PRESSURE: 101 MMHG | DIASTOLIC BLOOD PRESSURE: 65 MMHG | BODY MASS INDEX: 23.56 KG/M2

## 2021-11-23 DIAGNOSIS — Z12.72 ENCOUNTER FOR SCREENING FOR MALIGNANT NEOPLASM OF VAGINA: ICD-10-CM

## 2021-11-23 DIAGNOSIS — N91.1 SECONDARY AMENORRHEA: ICD-10-CM

## 2021-11-23 DIAGNOSIS — Z3A.37 37 WEEKS GESTATION OF PREGNANCY: ICD-10-CM

## 2021-11-23 DIAGNOSIS — N94.89 OTHER SPECIFIED CONDITIONS ASSOCIATED WITH FEMALE GENITAL ORGANS AND MENSTRUAL CYCLE: ICD-10-CM

## 2021-11-23 PROCEDURE — 0503F POSTPARTUM CARE VISIT: CPT

## 2021-11-23 RX ORDER — MULTI-VITAMIN/MINERAL SUPPLEMENT WITH SODIUM ASCORBATE, CHOLECALCIFEROL, DI-ALPHA-TOCOPHERYL ACETATE, THIAMINE MONONITRATE, RIBOFLAVIN, NIACINAMIDE, PYRIDOXINE HCL, FOLIC ACID, CYANOCOBALAMIN, CALCIUM FORMATE, CALCIUM CARBONATE, FERROUS (II) BIS-GLYCINATE CHELATE, POTASSIUM IODIDE, ZINC OXIDE, AND CHOLINE BITARTRATE 50; 155; 45; 32; 55; 30; 3; 1; 20; 10; 100; 10; 120; 3; 450 MG/1; MG/1; MG/1; MG/1; MG/1; [IU]/1; MG/1; MG/1; MG/1; UG/1; UG/1; MG/1; MG/1; MG/1; [IU]/1
32-1 TABLET, FILM COATED ORAL
Refills: 0 | Status: DISCONTINUED | COMMUNITY
End: 2021-11-23

## 2021-11-23 RX ORDER — ASCORBIC ACID, CHOLECALCIFEROL, .ALPHA.-TOCOPHEROL ACETATE, DL-, PYRIDOXINE, FOLIC ACID, CYANOCOBALAMIN, CALCIUM, FERROUS FUMARATE, MAGNESIUM, DOCONEXENT 85; 200; 10; 25; 1; 12; 140; 27; 45; 300 [IU]/1; [IU]/1; [IU]/1; [IU]/1; MG/1; UG/1; MG/1; MG/1; MG/1; MG/1
27-0.6-0.4-3 CAPSULE, GELATIN COATED ORAL
Refills: 0 | Status: DISCONTINUED | COMMUNITY
End: 2021-11-23

## 2021-11-25 NOTE — HISTORY OF PRESENT ILLNESS
[Postpartum Follow Up] : postpartum follow up [] : delivered by vaginal delivery [Back to Normal] : is back to normal in size [Mild] : mild vaginal bleeding [Normal] : the vagina was normal [Doing Well] : is doing well [No Sign of Infection] : is showing no signs of infection [Excellent Pain Control] : has excellent pain control [Last Pap Date: ___] : Last Pap Date: [unfilled] [Delivery Date: ___] : on [unfilled] [Female] : Delivery History: baby girl [Wt. ___] : weighing [unfilled] [Resumed Eola] : has resumed intercourse [Intended Contraception] : Intended Contraception: [Oral Contraceptives] : oral contraceptives [None] : No associated symptoms are reported [Examination Of The Breasts] : breasts are normal [Complications:___] : no complications [Breastfeeding] : not currently nursing [Resumed Menses] : has not resumed her menses [S/Sx PP Depression] : no signs/symptoms of postpartum depression [Erythema] : not erythematous [Cervix Sample Taken] : cervical sample not taken for a Pap smear [de-identified] : 25 year old F s/p  on 10/8/2021 presenting for postpartum visit. Liveborn female. She is bottle feeding. She is feeling well and is without complaints. Her mood is stable. Rx sent for Melisa with refills. [de-identified] : =============================================\par I, Celia Kilpatrick, acted solely as a scribe for Dr. Chente Rocha on Nov 23 2021  3:00PM. All medical entries made by the Scribe were at my, Dr. Chente Rocha's, direction and personally dictated by me on Nov 23 2021  3:00PM . I have reviewed the chart and agree that the record accurately reflects my personal performance of the history, physical exam, assessment, and plan. I have also personally directed, reviewed, and agreed with the chart.\par =============================================

## 2021-12-16 NOTE — OB RN PATIENT PROFILE - NUTRITION PROFILE
Discussed future YAG LASER Capsulotomy with patient. melanoma on back removed on tuesday. Oozing clear drainage yesterday from site. No indicators present

## 2021-12-22 ENCOUNTER — APPOINTMENT (OUTPATIENT)
Dept: OBGYN | Facility: CLINIC | Age: 26
End: 2021-12-22
Payer: MEDICAID

## 2021-12-22 PROCEDURE — 36415 COLL VENOUS BLD VENIPUNCTURE: CPT

## 2021-12-22 PROCEDURE — 99213 OFFICE O/P EST LOW 20 MIN: CPT

## 2021-12-22 RX ORDER — NORETHINDRONE 0.35 MG/1
0.35 TABLET ORAL
Qty: 1 | Refills: 6 | Status: DISCONTINUED | COMMUNITY
Start: 2021-11-23 | End: 2021-12-22

## 2021-12-22 NOTE — PLAN
[FreeTextEntry1] : 25 y/o P3 LMP 11/1/2021 + home pregnancy test \par -pap, GCT/CT done 04/2021\par -BHCG drawn \par - early IUP unable to visualize location of pregnancy. discussed need to confirm location of IUP prior to Mife. \par -f/u 2 weeks for confirmation of pregnancy and possible termination of pregnancy \par

## 2021-12-22 NOTE — HISTORY OF PRESENT ILLNESS
[N] : Patient does not use contraception [Y] : Positive pregnancy history [PapSmeardate] : 04/2021 [LMPDate] : 11/17/2021 [MensesFreq] : 30 [PGHxTotal] : 3 [[facility]] : 3 [PGHxPremature] : 0 [PGHxAbortions] : 0 [[facility] Phoenixiving] : 3 [FreeTextEntry1] :  x3  [Yes] : pregnancy [TextBox_6] : 11/17/2021 [Currently Active] : currently active [Men] : men [Vaginal] : vaginal [No] : No

## 2021-12-23 LAB — HCG SERPL-MCNC: 395 MIU/ML

## 2022-01-05 ENCOUNTER — APPOINTMENT (OUTPATIENT)
Dept: ANTEPARTUM | Facility: CLINIC | Age: 27
End: 2022-01-05

## 2022-01-05 ENCOUNTER — APPOINTMENT (OUTPATIENT)
Dept: OBGYN | Facility: CLINIC | Age: 27
End: 2022-01-05
Payer: MEDICAID

## 2022-01-05 VITALS — WEIGHT: 130 LBS | BODY MASS INDEX: 23.03 KG/M2 | SYSTOLIC BLOOD PRESSURE: 105 MMHG | DIASTOLIC BLOOD PRESSURE: 67 MMHG

## 2022-01-05 PROCEDURE — 99213 OFFICE O/P EST LOW 20 MIN: CPT

## 2022-01-05 PROCEDURE — 76801 OB US < 14 WKS SINGLE FETUS: CPT

## 2022-01-05 RX ORDER — MIFEPRISTONE 200 MG/1
200 TABLET ORAL
Refills: 0 | Status: COMPLETED | OUTPATIENT
Start: 2022-01-05

## 2022-01-05 RX ADMIN — Medication 0 MG: at 00:00

## 2022-01-05 NOTE — HISTORY OF PRESENT ILLNESS
[FreeTextEntry1] : \par Patient is 26 yr old  Sonogram today showing SLIUP with CRL measuring 6w 2d weeks. Patient does not wish to continue her pregnancy.\par

## 2022-01-05 NOTE — PLAN
[FreeTextEntry1] : 26 yr old PX at 6 weeks by LMP presenting for termination of pregnancy \par -patient counseled on options including medical and surgical management. Risks and benefits of each option reviewed in detail\par -US + IUP +FH, see sonpgram report for further information \par -Patient opting for medical management at this time.  B+ blood type .  Mifepristone 200mg administered in the office. 24hrs hours after mifepristone ingestion, patient instructed to place 4 misoprostol pills (800 mcg) vaginally every 12 hours for 2 doses. Patient instructed to call the office if she does not have any bleeding within 12 hours after completing this regimen to discuss repeat dosing. Written instructions given, patient should expect vaginal bleeding and cramping. Bleeding and infection precautions given and patient instructed to call the emergency line or present to ED if soaking > 2 pads and hour for 2 hours or experiencing fevers, chills or signs of infection.\par -f/u in 2 weeks for f/u of MAB\par \par \par

## 2022-01-18 ENCOUNTER — APPOINTMENT (OUTPATIENT)
Dept: ANTEPARTUM | Facility: CLINIC | Age: 27
End: 2022-01-18
Payer: MEDICAID

## 2022-01-18 ENCOUNTER — APPOINTMENT (OUTPATIENT)
Dept: OBGYN | Facility: CLINIC | Age: 27
End: 2022-01-18
Payer: MEDICAID

## 2022-01-18 VITALS — BODY MASS INDEX: 23.74 KG/M2 | SYSTOLIC BLOOD PRESSURE: 111 MMHG | WEIGHT: 134 LBS | DIASTOLIC BLOOD PRESSURE: 69 MMHG

## 2022-01-18 DIAGNOSIS — Z33.2 ENCOUNTER FOR ELECTIVE TERMINATION OF PREGNANCY: ICD-10-CM

## 2022-01-18 DIAGNOSIS — Z87.42 PERSONAL HISTORY OF OTHER DISEASES OF THE FEMALE GENITAL TRACT: ICD-10-CM

## 2022-01-18 DIAGNOSIS — Z00.00 ENCOUNTER FOR GENERAL ADULT MEDICAL EXAMINATION W/OUT ABNORMAL FINDINGS: ICD-10-CM

## 2022-01-18 DIAGNOSIS — Q27.39 ARTERIOVENOUS MALFORMATION, OTHER SITE: ICD-10-CM

## 2022-01-18 DIAGNOSIS — O07.4 FAILED ATTEMPTED TERMINATION OF PREGNANCY W/OUT COMPLICATION: ICD-10-CM

## 2022-01-18 PROCEDURE — 76830 TRANSVAGINAL US NON-OB: CPT

## 2022-01-18 PROCEDURE — 76856 US EXAM PELVIC COMPLETE: CPT

## 2022-01-18 PROCEDURE — 99213 OFFICE O/P EST LOW 20 MIN: CPT

## 2022-01-18 RX ORDER — MISOPROSTOL 200 UG/1
200 TABLET ORAL
Qty: 8 | Refills: 0 | Status: DISCONTINUED | COMMUNITY
Start: 2022-01-05 | End: 2022-01-18

## 2022-01-20 PROBLEM — Z00.00 ENCOUNTER FOR PREVENTIVE HEALTH EXAMINATION: Status: ACTIVE | Noted: 2020-02-27

## 2022-01-20 LAB
BASOPHILS # BLD AUTO: 0.03 K/UL
BASOPHILS NFR BLD AUTO: 0.6 %
EOSINOPHIL # BLD AUTO: 0.04 K/UL
EOSINOPHIL NFR BLD AUTO: 0.7 %
HCT VFR BLD CALC: 31.1 %
HGB BLD-MCNC: 9.4 G/DL
IMM GRANULOCYTES NFR BLD AUTO: 0.2 %
LYMPHOCYTES # BLD AUTO: 1.88 K/UL
LYMPHOCYTES NFR BLD AUTO: 35.1 %
MAN DIFF?: NORMAL
MCHC RBC-ENTMCNC: 23.6 PG
MCHC RBC-ENTMCNC: 30.2 GM/DL
MCV RBC AUTO: 77.9 FL
MONOCYTES # BLD AUTO: 0.44 K/UL
MONOCYTES NFR BLD AUTO: 8.2 %
NEUTROPHILS # BLD AUTO: 2.96 K/UL
NEUTROPHILS NFR BLD AUTO: 55.2 %
PLATELET # BLD AUTO: 239 K/UL
RBC # BLD: 3.99 M/UL
RBC # FLD: 16 %
WBC # FLD AUTO: 5.36 K/UL

## 2022-01-21 ENCOUNTER — APPOINTMENT (OUTPATIENT)
Dept: OBGYN | Facility: CLINIC | Age: 27
End: 2022-01-21

## 2022-01-21 LAB — HCG SERPL-MCNC: 3346 MIU/ML

## 2022-02-22 ENCOUNTER — APPOINTMENT (OUTPATIENT)
Dept: OBGYN | Facility: CLINIC | Age: 27
End: 2022-02-22
Payer: MEDICAID

## 2022-02-22 VITALS
SYSTOLIC BLOOD PRESSURE: 106 MMHG | DIASTOLIC BLOOD PRESSURE: 67 MMHG | BODY MASS INDEX: 23.39 KG/M2 | HEIGHT: 63 IN | WEIGHT: 132 LBS

## 2022-02-22 PROCEDURE — 36415 COLL VENOUS BLD VENIPUNCTURE: CPT

## 2022-02-22 PROCEDURE — 99214 OFFICE O/P EST MOD 30 MIN: CPT

## 2022-02-24 LAB — HCG SERPL-MCNC: 29 MIU/ML

## 2022-02-25 NOTE — HISTORY OF PRESENT ILLNESS
[FreeTextEntry1] : 27 yo female presents today s/p TOP on 1/5/22. Patient presents today for a f/u gyn sono. Patient reports that shes been having vaginal bleeding everyday since taking the cytotec on 1/5/22. Patient denies bleeding through more than 2 pads an hour.

## 2022-02-25 NOTE — PLAN
[FreeTextEntry1] : 27 yo female presents today for gyn sono s/p TOP on 1/5/22:\par -gyn sono shows endo of 21mm an dretained products of conception- see official sono report\par -f/u hcg\par -sono images reviewed with Dr. Rocha- follow with serial hcg's and repeat sono as per Dr. Rocha\par

## 2022-04-11 PROBLEM — Z33.2 ENCOUNTER FOR MEDICAL TERMINATION OF PREGNANCY: Status: ACTIVE | Noted: 2022-01-05

## 2022-04-11 PROBLEM — O07.4 RETAINED PRODUCTS OF CONCEPTION AFTER INDUCED TERMINATION OF PREGNANCY: Status: ACTIVE | Noted: 2022-02-22

## 2022-04-11 PROBLEM — Q27.39 ARTERIOVENOUS MALFORMATION OF UTERUS: Status: ACTIVE | Noted: 2022-04-11

## 2022-04-11 NOTE — PHYSICAL EXAM
[Labia Majora] : normal [Labia Minora] : normal [Normal] : normal [Uterine Adnexae] : normal [Chaperone Present] : A chaperone was present in the examining room during all aspects of the physical examination [No Bleeding] : There was no active vaginal bleeding

## 2022-04-11 NOTE — HISTORY OF PRESENT ILLNESS
[FreeTextEntry1] : Patient is a 26 year old presenting for follow up s/p ETOP with Mifepristone/Misoprostol given on 1/5/22. LMP 11/17/2021. She endorses bleeding and passage of clots, however bleeding has now mostly subsided. Denies pain or fever.

## 2022-07-26 NOTE — OB PROVIDER TRIAGE NOTE - NSINFECTIONS_OBGYN_ALL_OB
Unknown at this time [x] [facility]  [address]  [phone]  F: [phone] [] [address]  [facility]   [address]  P: [phone]  F: [phone] [] [address]  [address]  P: [phone]  F: [phone] [] [address]  P: [phone]  F: [phone] [] [address]  [facility]   [address]   [place]: [phone]  F: [phone]      Physical Therapy Daily Treatment Note    Date:  2022  Patient Name:  Vy Brown    :  1967  MRN: [id number]  Physician: Hilda Lindsey MD                                   Insurance: Medicare, [address] [place] [address], Medicaid  Medical Diagnosis: Right shoulder pain, tendinitis of right rotator cuff                     Rehab Codes: M 25.511, M 25.611, M 54.2, M 62.81, R 20.2, R 29.3  Onset Date: 22                 Next 's appt: Not scheduled  Visit# / total visits: ; Progress note for Medicare patient due at visit 10     Cancels/No Shows: 0/0    Subjective:    Pain:  [x] Yes  [] No Location: Right shoulder   Pain Rating: (0-10 scale) 1/10  Pain altered Tx:  [x] No  [] Yes  Action:  Comments:  States she is going to doc for neck Thursday. Feels she may be ready for discharge for shoulder therapy on Friday. Patient struggles to explain when or how often she has shoulder pain, or even neck pain. Today's pain is 1/10 in her shoulder, and does not have neck pain today. Unsure when she has shoulder pain. Cannot relate any exacerbating activities. States she raked at home (suspect she was cleaning the pool) and did not have pain. Objective:  Modalities: Refused need 22   Precautions:  Dread Clarke present for interpretation.   Per sister, on the autism spectrum. From Dr. Simmons Smaller: Work on Right shoulder ROM. RTC/Scapular stabilizer strengthening. Biceps strengthening. Also work on stretching/strengthening Cervical spine. Evaluate and treat. Modalities as needed. Teach home exercise program.   2-3 times a week for 4-6 weeks. Exercises:  Exercise Reps/ Time Weight/ Level Completed 07/26/22  Comments   SCIFIT 5 min L 2.0  Change to UBE next visit. UBE 4 min L 3.0 x    Corner stretch 3 x 10 sec x VC for stretching with no pain   Reverse wall push ups 20 x  x    Mini wall push up on medium green ball with short hold 20 x  x Mini push up, slight hold. Wall push up 20 x  x Window sill 7.21   Pertubations of ball 2x30\" ea  x Flexion/ext  Medial/lateral   Ball flexion on wall 10 x  x Lime green medium ball   Small circles on wall 20 x ea   CW, CCW; lime green medium ball. Horizontal abduction 10 x Lime x    B ER 10 x Lime x    Rebounder tosses 20 x 4 lb (lime small weighted ball) x Forward (held IR 7/26/22)                        Shoulder flexion 15 x 2 lbs x Cane   Shoulder abd 15 x 2 lb x Cane   Shoulder scaption 15 x AROM x    Biceps curls 20 x 3 lb x           Thera bands       Rows 20 x purple x    Triceps 20 x purple x    Extension 20 x purple x    Lat pull down 10 x Purple x           Cane ext 20x 2 lbs x    Cane IR 20x 2 lbs x                  Upper trap stretch with arm behind back 3 x ea 10 sec  Some N/T right arm with stretch. HEP          Manual  8 minmin  x Hypervolt along rhomboids, teres, upper traps          Posture 2 min   Education. HEP to stretch 10 x per hour, holding 5 seconds; repeat 10 x throughout the day.    Posterior shoulder rolls 10x  AA AA for technique  Added 7.19          Prone on total gym       - extension 15 x 3 lb x    - horizontal abduction 15 x 3 lb x    - rows 15 x  3 lb x    -flexion 10 x 3 lb                     Other:      Treatment Charges: Mins Units   []  Modalities     [x]  Ther Exercise 35 2   [x]  Manual Therapy 8 1   []  Ther Activities     []  Aquatics     []  Vasocompression     []  Other     Total Treatment time 43        Assessment: [x] Progressing toward goals. Added exs as noted, reps/weights changed to reflect progressions. Patient did well with increased weight for rebounder. Found that the Hypervolt is patients' favorite thing and completed manual only with the device. Added scaption for continued strengthening. [] No change. [] Other:     [x] Patient would continue to benefit from skilled physical therapy services in order to: improved posture, improved ROM, improved strength, decreased tingling/numbness in arm, decreased pain in right shoulder and down right arm, decrease cervical spine pain, improve headaches. STG: (to be met in 6 treatments)  ? Pain: Right shoulder pain improve to 5/10 with active movements  ? ROM: Seated right shoulder abduction improve to 130 degrees without pain. Supine right shoulder abduction improve to 140 degrees without pain. ? Strength: Right shoulder strength improve to 4/5 without pain  ? Function: Patient to report chores around her home have become easier  Patient to be independent with home exercise program as demonstrated by performance with correct form without cues. LTG: (to be met in 12 treatments)  Right shoulder pain improve to 2/10 at max with heavy lifting  Supine right shoulder ER improve to 90 degrees, IR to 90 degrees. Patient able to complete all ADLs and IADLs without significant pain  Patient to sit with more upright posture. UEFI score improve to lacking 35% or less. Patient to report resolution of pain radiating in to right arm  Cervical ROM improve to: flexion 50 degrees, extension 45 degrees, left rotation 80 degrees, left side bend 30 degrees. Patient goals: (none stated)    Pt.  Education:  [x] Yes  [] No  [] Reviewed Prior HEP/Ed  Method of Education: [x] Verbal  [x] Demo  [x] Written  --  7.21.22 HEP printed and pt took picture of QR code on her phone for the videos. Comprehension of Education:  [] Verbalizes understanding. [x] Demonstrates understanding. [x] Needs review. [x] Demonstrates/verbalizes HEP/Ed previously given. Access Code: Alfonso Gomezizzle: [email or web address]: 07/21/2022Prepared by: [address] with [employer] - 1 x daily - 7 x weekly - 3 sets - 10 reps   TB ER - 1 x daily - 7 x weekly - 3 sets - 10 reps   TB IR - 1 x daily - 7 x weekly - 3 sets - 10 reps   TB Shoulder extension - 1 x daily - 7 x weekly - 3 sets - 10 reps   TB Rows - 1 x daily - 7 x weekly - 3 sets - 10 reps   HAB - 1 x daily - 7 x weekly - 3 sets - 10 reps   Standing Single Arm Shoulder External Rotation in Abduction with Anchored Resistance - 1 x daily - 7 x weekly - 3 sets - 10 reps   Reverse push ups - 1 x daily - 7 x weekly - 3 sets - 10 reps   Wall Push Up - 1 x daily - 7 x weekly - 3 sets - 10 reps     Plan: [x] Continue current frequency toward long and short term goals.     [x] Specific Instructions for subsequent treatments:  Supine over half roll to stretch chest.        Time In: 1202         Time Out: 1250    Electronically signed by:  Kaye Serna PT

## 2023-01-09 ENCOUNTER — APPOINTMENT (OUTPATIENT)
Dept: OBGYN | Facility: CLINIC | Age: 28
End: 2023-01-09

## 2023-01-26 ENCOUNTER — LABORATORY RESULT (OUTPATIENT)
Age: 28
End: 2023-01-26

## 2023-01-26 ENCOUNTER — APPOINTMENT (OUTPATIENT)
Dept: OBGYN | Facility: CLINIC | Age: 28
End: 2023-01-26
Payer: MEDICAID

## 2023-01-26 VITALS
BODY MASS INDEX: 22.68 KG/M2 | SYSTOLIC BLOOD PRESSURE: 93 MMHG | WEIGHT: 128 LBS | HEIGHT: 63 IN | DIASTOLIC BLOOD PRESSURE: 59 MMHG

## 2023-01-26 PROCEDURE — 0501F PRENATAL FLOW SHEET: CPT

## 2023-01-26 NOTE — HISTORY OF PRESENT ILLNESS
[Yes] : pregnancy [Currently Active] : currently active [Men] : men [No] : No [FreeTextEntry1] : Patient is a 27 year old who presents after she had a positive home pregnancy test. LMP12/9/22. She is endorsing occasional nausea and breast tenderness.\par  5w6d\par \par  [TextBox_6] : 12/9/22

## 2023-01-26 NOTE — PLAN
[FreeTextEntry1] : -f/u PAP and GC/CT done today\par -f/u prenatal blood work drawn today\par -Rx sent for PNV and Doxylamine-Pyridoxine

## 2023-01-29 LAB
ABO + RH PNL BLD: NORMAL
APPEARANCE: ABNORMAL
BACTERIA: NEGATIVE
BASOPHILS # BLD AUTO: 0.04 K/UL
BASOPHILS NFR BLD AUTO: 0.9 %
BILIRUBIN URINE: NEGATIVE
BLD GP AB SCN SERPL QL: NORMAL
BLOOD URINE: NEGATIVE
C TRACH RRNA SPEC QL NAA+PROBE: NOT DETECTED
COLOR: YELLOW
EOSINOPHIL # BLD AUTO: 0 K/UL
EOSINOPHIL NFR BLD AUTO: 0 %
ESTIMATED AVERAGE GLUCOSE: 85 MG/DL
GLUCOSE QUALITATIVE U: NEGATIVE
GLUCOSE SERPL-MCNC: 73 MG/DL
HBA1C MFR BLD HPLC: 4.6 %
HBV SURFACE AG SER QL: NONREACTIVE
HCT VFR BLD CALC: 35.7 %
HGB A MFR BLD: 97.3 %
HGB A2 MFR BLD: 2.7 %
HGB BLD-MCNC: 12.2 G/DL
HGB FRACT BLD-IMP: NORMAL
HIV1+2 AB SPEC QL IA.RAPID: NONREACTIVE
HPV 16 E6+E7 MRNA CVX QL NAA+PROBE: NOT DETECTED
HPV HIGH+LOW RISK DNA PNL CVX: NOT DETECTED
HPV18+45 E6+E7 MRNA CVX QL NAA+PROBE: NOT DETECTED
HYALINE CASTS: 0 /LPF
KETONES URINE: NEGATIVE
LEAD BLD-MCNC: <1 UG/DL
LEUKOCYTE ESTERASE URINE: ABNORMAL
LYMPHOCYTES # BLD AUTO: 1.67 K/UL
LYMPHOCYTES NFR BLD AUTO: 37.7 %
MAN DIFF?: NORMAL
MCHC RBC-ENTMCNC: 30.3 PG
MCHC RBC-ENTMCNC: 34.2 GM/DL
MCV RBC AUTO: 88.6 FL
MEV IGG FLD QL IA: >300 AU/ML
MEV IGG+IGM SER-IMP: POSITIVE
MICROSCOPIC-UA: NORMAL
MONOCYTES # BLD AUTO: 0.19 K/UL
MONOCYTES NFR BLD AUTO: 4.4 %
MUV AB SER-ACNC: POSITIVE
MUV IGG SER QL IA: 42.7 AU/ML
N GONORRHOEA RRNA SPEC QL NAA+PROBE: NOT DETECTED
NEUTROPHILS # BLD AUTO: 2.52 K/UL
NEUTROPHILS NFR BLD AUTO: 57 %
NITRITE URINE: POSITIVE
PH URINE: 6.5
PLATELET # BLD AUTO: 180 K/UL
PROTEIN URINE: NEGATIVE
RBC # BLD: 4.03 M/UL
RBC # FLD: 13.5 %
RED BLOOD CELLS URINE: 2 /HPF
RUBV IGG FLD-ACNC: 19.2 INDEX
RUBV IGG SER-IMP: POSITIVE
SOURCE AMPLIFICATION: NORMAL
SPECIFIC GRAVITY URINE: 1.01
SQUAMOUS EPITHELIAL CELLS: 5 /HPF
T PALLIDUM AB SER QL IA: NEGATIVE
UROBILINOGEN URINE: NORMAL
WBC # FLD AUTO: 4.42 K/UL
WHITE BLOOD CELLS URINE: 16 /HPF

## 2023-01-30 ENCOUNTER — NON-APPOINTMENT (OUTPATIENT)
Age: 28
End: 2023-01-30

## 2023-01-30 LAB — CYTOLOGY CVX/VAG DOC THIN PREP: NORMAL

## 2023-01-31 LAB — FMR1 GENE MUT ANL BLD/T: NORMAL

## 2023-02-01 ENCOUNTER — NON-APPOINTMENT (OUTPATIENT)
Age: 28
End: 2023-02-01

## 2023-02-01 LAB
M TB IFN-G BLD-IMP: NEGATIVE
QUANTIFERON TB PLUS MITOGEN MINUS NIL: 2.83 IU/ML
QUANTIFERON TB PLUS NIL: 0.04 IU/ML
QUANTIFERON TB PLUS TB1 MINUS NIL: 0.02 IU/ML
QUANTIFERON TB PLUS TB2 MINUS NIL: 0.04 IU/ML

## 2023-02-02 DIAGNOSIS — N39.0 URINARY TRACT INFECTION, SITE NOT SPECIFIED: ICD-10-CM

## 2023-02-04 LAB
AR GENE MUT ANL BLD/T: NORMAL
BACTERIA UR CULT: ABNORMAL

## 2023-02-06 LAB — CFTR MUT TESTED BLD/T: NEGATIVE

## 2023-02-09 ENCOUNTER — APPOINTMENT (OUTPATIENT)
Dept: OBGYN | Facility: CLINIC | Age: 28
End: 2023-02-09
Payer: MEDICAID

## 2023-02-09 ENCOUNTER — APPOINTMENT (OUTPATIENT)
Dept: ANTEPARTUM | Facility: CLINIC | Age: 28
End: 2023-02-09
Payer: MEDICAID

## 2023-02-09 VITALS — WEIGHT: 129 LBS | SYSTOLIC BLOOD PRESSURE: 92 MMHG | BODY MASS INDEX: 22.85 KG/M2 | DIASTOLIC BLOOD PRESSURE: 59 MMHG

## 2023-02-09 PROCEDURE — 76801 OB US < 14 WKS SINGLE FETUS: CPT

## 2023-02-09 PROCEDURE — 0502F SUBSEQUENT PRENATAL CARE: CPT

## 2023-03-09 ENCOUNTER — NON-APPOINTMENT (OUTPATIENT)
Age: 28
End: 2023-03-09

## 2023-03-09 ENCOUNTER — APPOINTMENT (OUTPATIENT)
Dept: ANTEPARTUM | Facility: CLINIC | Age: 28
End: 2023-03-09
Payer: MEDICAID

## 2023-03-09 ENCOUNTER — APPOINTMENT (OUTPATIENT)
Dept: OBGYN | Facility: CLINIC | Age: 28
End: 2023-03-09
Payer: MEDICAID

## 2023-03-09 VITALS — WEIGHT: 130 LBS | DIASTOLIC BLOOD PRESSURE: 66 MMHG | SYSTOLIC BLOOD PRESSURE: 106 MMHG | BODY MASS INDEX: 23.03 KG/M2

## 2023-03-09 PROCEDURE — 0502F SUBSEQUENT PRENATAL CARE: CPT

## 2023-03-09 PROCEDURE — 76813 OB US NUCHAL MEAS 1 GEST: CPT

## 2023-03-13 LAB
ADDITIONAL US: NORMAL
CRL SCAN TWIN B: NORMAL
CRL SCAN: NORMAL
CROWN RUMP LENGTH TWIN B: NORMAL
CROWN RUMP LENGTH: 57 MM
DIA MOM: 0.86
DIA VALUE: 230.5 PG/ML
DOWN SYNDROME AGE RISK: NORMAL
DOWN SYNDROME INTERPRETATION: NORMAL
DOWN SYNDROME SCREENING RISK: NORMAL
FIRST TRIMESTER SCREEN COMMENTS: NORMAL
FIRST TRIMESTER SCREEN NOTE: NORMAL
FIRST TRIMESTER SCREEN RESULTS: NORMAL
FIRST TRIMESTER SCREEN TEST RESULTS: NORMAL
GEST. AGE ON COLLECTION DATE: 12.1 WEEKS
HCG MOM: 1.32
HCG VALUE: 150.7 IU/ML
MATERNAL AGE AT EDD: 29.8 YR
NT MOM TWIN B: NORMAL
NT TWIN B: NORMAL
NUCHAL TRANSLUCENCY (NT): 1.5 MM
NUCHAL TRANSLUCENCY MOM: 1.01
NUMBER OF FETUSES: 1
PAPP-A MOM: 1.1
PAPP-A VALUE: 1152.1 NG/ML
RACE: NORMAL
SONOGRAPHER ID#: NORMAL
TRISOMY 18 AGE RISK: NORMAL
TRISOMY 18 INTERPRETATION: NORMAL
TRISOMY 18 SCREENING RISK: NORMAL
WEIGHT AFP: 130 LBS

## 2023-03-16 ENCOUNTER — NON-APPOINTMENT (OUTPATIENT)
Age: 28
End: 2023-03-16

## 2023-03-16 ENCOUNTER — APPOINTMENT (OUTPATIENT)
Dept: OBGYN | Facility: CLINIC | Age: 28
End: 2023-03-16

## 2023-03-16 DIAGNOSIS — Z33.1 PREGNANT STATE, INCIDENTAL: ICD-10-CM

## 2023-03-22 LAB
CHROMOSOME13 INTERPRETATION: NORMAL
CHROMOSOME13 TEST RESULT: NORMAL
CHROMOSOME18 INTERPRETATION: NORMAL
CHROMOSOME18 TEST RESULT: NORMAL
CHROMOSOME21 INTERPRETATION: NORMAL
CHROMOSOME21 TEST RESULT: NORMAL
FETAL FRACTION: NORMAL
PERFORMANCE AND LIMITATIONS: NORMAL
SEX CHROMOSOME INTERPRETATION: NORMAL
SEX CHROMOSOME TEST RESULT: NORMAL
VERIFI PRENATAL TEST: NOT DETECTED

## 2023-04-11 ENCOUNTER — APPOINTMENT (OUTPATIENT)
Dept: OBGYN | Facility: CLINIC | Age: 28
End: 2023-04-11
Payer: MEDICAID

## 2023-04-11 ENCOUNTER — APPOINTMENT (OUTPATIENT)
Dept: ANTEPARTUM | Facility: CLINIC | Age: 28
End: 2023-04-11

## 2023-04-11 VITALS
DIASTOLIC BLOOD PRESSURE: 71 MMHG | WEIGHT: 135 LBS | BODY MASS INDEX: 23.92 KG/M2 | SYSTOLIC BLOOD PRESSURE: 108 MMHG | HEIGHT: 63 IN

## 2023-04-11 PROCEDURE — 0502F SUBSEQUENT PRENATAL CARE: CPT

## 2023-04-14 LAB
AFP MOM: 0.96
AFP VALUE: 40 NG/ML
ALPHA FETOPROTEIN SERUM COMMENT: NORMAL
ALPHA FETOPROTEIN SERUM INTERPRETATION: NORMAL
ALPHA FETOPROTEIN SERUM RESULTS: NORMAL
ALPHA FETOPROTEIN SERUM TEST RESULTS: NORMAL
GESTATIONAL AGE BASED ON: NORMAL
GESTATIONAL AGE ON COLLECTION DATE: 16.9 WEEKS
INSULIN DEP DIABETES: NO
MATERNAL AGE AT EDD AFP: 29.8 YR
MULTIPLE GESTATION: NO
OSBR RISK 1 IN: NORMAL
RACE: NORMAL
WEIGHT AFP: 135 LBS

## 2023-05-11 ENCOUNTER — APPOINTMENT (OUTPATIENT)
Dept: ANTEPARTUM | Facility: CLINIC | Age: 28
End: 2023-05-11
Payer: MEDICAID

## 2023-05-11 ENCOUNTER — APPOINTMENT (OUTPATIENT)
Dept: OBGYN | Facility: CLINIC | Age: 28
End: 2023-05-11
Payer: MEDICAID

## 2023-05-11 VITALS — WEIGHT: 135 LBS | DIASTOLIC BLOOD PRESSURE: 59 MMHG | SYSTOLIC BLOOD PRESSURE: 94 MMHG | BODY MASS INDEX: 23.91 KG/M2

## 2023-05-11 PROCEDURE — 76805 OB US >/= 14 WKS SNGL FETUS: CPT

## 2023-05-11 PROCEDURE — 0502F SUBSEQUENT PRENATAL CARE: CPT

## 2023-05-11 PROCEDURE — 76817 TRANSVAGINAL US OBSTETRIC: CPT | Mod: 59

## 2023-05-12 LAB
APPEARANCE: CLEAR
BACTERIA: NEGATIVE /HPF
BILIRUBIN URINE: NEGATIVE
BLOOD URINE: NEGATIVE
CAST: 1 /LPF
COLOR: NORMAL
EPITHELIAL CELLS: 3 /HPF
GLUCOSE QUALITATIVE U: NEGATIVE MG/DL
KETONES URINE: NEGATIVE MG/DL
LEUKOCYTE ESTERASE URINE: ABNORMAL
MICROSCOPIC-UA: NORMAL
NITRITE URINE: NEGATIVE
PH URINE: 6.5
PROTEIN URINE: NORMAL MG/DL
RED BLOOD CELLS URINE: 1 /HPF
SPECIFIC GRAVITY URINE: 1.03
UROBILINOGEN URINE: 1 MG/DL
WHITE BLOOD CELLS URINE: 0 /HPF

## 2023-05-16 LAB — BACTERIA UR CULT: NORMAL

## 2023-06-04 NOTE — OB PROVIDER TRIAGE NOTE - HISTORY OF PRESENT ILLNESS
Attending Attestation (For Attendings USE Only)... 24 yo  @ 38.6 wks comes in c/o needing to be induced sent in from Dr Rocha office, saw Dr Wagner and followed by IUGR, last 3 fetal growth scans show n growth <10% 2565 gms no change from last growth scan sent to be induced. denies vb or lof. reports +GFM. 1-2 cms in office, ADRIANA 13, BPP 10/10, elevated dopplers.    GBS; negative    meds: PNV  All: PCN rash    PMH: denies  PSH: denies  gynhx: denies  ob hx: denies

## 2023-06-08 ENCOUNTER — APPOINTMENT (OUTPATIENT)
Dept: OBGYN | Facility: CLINIC | Age: 28
End: 2023-06-08
Payer: MEDICAID

## 2023-06-08 VITALS
SYSTOLIC BLOOD PRESSURE: 102 MMHG | WEIGHT: 138 LBS | DIASTOLIC BLOOD PRESSURE: 60 MMHG | BODY MASS INDEX: 24.45 KG/M2 | HEIGHT: 63 IN

## 2023-06-08 PROCEDURE — 0502F SUBSEQUENT PRENATAL CARE: CPT

## 2023-06-08 PROCEDURE — 36415 COLL VENOUS BLD VENIPUNCTURE: CPT

## 2023-06-08 PROCEDURE — 82946 GLUCAGON TOLERANCE TEST: CPT

## 2023-06-09 LAB — GLUCOSE 1H P 50 G GLC PO SERPL-MCNC: 92 MG/DL

## 2023-06-13 ENCOUNTER — NON-APPOINTMENT (OUTPATIENT)
Age: 28
End: 2023-06-13

## 2023-06-28 NOTE — OB PROVIDER TRIAGE NOTE - NSFIRSTDATEVISIT_OBGYN_ALL_OB
[facility]  UROLOGY  ________________________________________________    OUTPATIENT NOTE    Patient: Saqib Fink    : 1961  MRN: [id number]          UROLOGIC COMPLAINTS TODAY      Non bothersome urinary frequency     HISTORY OF PRESENT ILLNESS     Saqib Fink is a 61 year old Patient here referred by oncologist Dr. Palafox to establish with new urologist. Previous urologist Dr. Gordillo at [facility] [place]. Patient experienced gross hematuria 3 months ago. CT guided biopsy on 23 diagnosed with cancer. Patient had consult with oncologist Dr. Palafox. Patient has since undergone several imaging scans. Patient started chemo once a week with Dr. Palafox 3-4 weeks ago. Patient c/o nonbothersome urinary frequency every 1-2 hours.  Patient had a retroperitoneal mass biopsied this was found to be some form of urothelial carcinoma which is metastatic is also putting extrinsic compression on the ureters not causing pain patient is being seen by Dr. Byers is started chemotherapy at this point in time there is no urologic intervention required however if hydronephrosis or some other urologic malady occurs we will be available.  Discussed this case with Dr. Byers he will do most of the follow-up and refer back if urologic intervention is needed.        Pertinent labs:  Results for orders placed or performed in visit on 23   POCT Urine Dip Non-Auto   Result Value    POCT Color Yellow    POCT Appearance Clear    POCT Glucose Urine Negative    POCT Bilirubin Negative    POCT Ketones Negative    POCT Specific [place] 1.015    POCT Occult Blood Negative    POCT pH 8.5 (A)    POCT Protein Negative    POCT Urobilinogen 0.2    Urine Nitrite Negative    WBC (Leukocyte) Esterase POC Negative   POCT POST VOID RES URINE/BL BY US   Result Value    BLDR Scan mLs 2 ml     BLDR Scan mLs   Date Value Ref Range Status   2023 2 ml  Final         REVIEW OF SYSTEMS     All systems are negative  unless otherwise stated in the HPI  Review of Systems   Genitourinary: Positive for frequency. Negative for dysuria, flank pain, hematuria and urgency.           PAST HISTORY      Past Medical History:   Diagnosis Date   • H/O hernia repair    • Renal mass    • Screening PSA (prostate specific antigen)     MONITORS WITH PCP       Past Surgical History:   Procedure Laterality Date   • Hernia repair         History reviewed. No pertinent family history.      Tobacco Use: Not Asked         Alcohol Use: Not Asked         Drug Use:    Not Asked             MEDICATIONS AND ALLERGIES        Current Outpatient Medications   Medication Sig Dispense Refill   • Calcium Acetate, Phos Binder, (CALCIUM ACETATE PO)      • HYDROcodone-acetaminophen (Norco)  MG per tablet Take 1 tablet by mouth in the morning and 1 tablet at noon and 1 tablet in the evening. Do not drive. 12 tablet 0   • diazePAM (Valium) 5 MG tablet Take 1 tablet by mouth in the morning and 1 tablet in the evening. Do all this for 4 days. 8 tablet 0   • omeprazole (PrilOSEC) 20 MG capsule Take 20 mg by mouth daily.     • Multiple Vitamins-Minerals (OCUVITE ADULT 50+ PO)        No current facility-administered medications for this visit.       Medications were reviewed and updated today.      ALLERGIES  ALLERGIES:  No Known Allergies        PHYSICAL EXAM AND VITAL SIGNS       Visit Vitals  /69 (BP Location: RUE - Right upper extremity, Patient Position: Sitting, Cuff Size: Regular)   Pulse 69   Ht 5' 9\" (1.753 m)   Wt 56.6 kg (124 lb 11.2 oz)   BMI 18.41 kg/m²          General: in no acute distress.   HNT: normocephalic, neck supple, atraumatic  Chest: nonlabored respirations, symmetric chest expansion  Abdomen: soft, nt, nd  : no cvat bilaterally  Neuro: alert and oriented  Affect: appropriate      LABORATORY     Results for orders placed or performed in visit on 06/28/23   POCT Urine Dip Non-Auto   Result Value    POCT Color Yellow    POCT Appearance  Clear    POCT Glucose Urine Negative    POCT Bilirubin Negative    POCT Ketones Negative    POCT Specific [place] 1.015    POCT Occult Blood Negative    POCT pH 8.5 (A)    POCT Protein Negative    POCT Urobilinogen 0.2    Urine Nitrite Negative    WBC (Leukocyte) Esterase POC Negative   POCT POST VOID RES URINE/BL BY US   Result Value    BLDR Scan mLs 2 ml       BLDR Scan mLs   Date Value Ref Range Status   06/28/2023 2 ml  Final         Office Visit on 06/28/2023   Component Date Value Ref Range Status   • POCT Color 06/28/2023 Yellow   Final   • POCT Appearance 06/28/2023 Clear   Final   • POCT Glucose Urine 06/28/2023 Negative  Negative, Normal mg/dL Final   • POCT Bilirubin 06/28/2023 Negative  Negative, About 10 Ganesh/ul, 5-10 Ganesh/ul, 50 Ganesh/ul Final   • POCT Ketones 06/28/2023 Negative  Negative, 100 mg/dl mg/dL Final   • POCT Specific Gravity 06/28/2023 1.015  1.005, 1.010, 1.015, 1.020, 1.025, 1.030 Final   • POCT Occult Blood 06/28/2023 Negative  Negative Final   • POCT pH 06/28/2023 8.5 (A)  5.0, 6.0, 7.0, 5.5, 6.5 Final   • POCT Protein 06/28/2023 Negative  Negative, 1000 mg/dl, 20 mg/dl, 40 mg/dl, 2000 mg/dl mg/dL Final   • POCT Urobilinogen 06/28/2023 0.2  1.0, 0.2, Normal mg/dL Final   • Urine Nitrite 06/28/2023 Negative  Negative Final   • WBC (Leukocyte) Esterase POC 06/28/2023 Negative  Negative Final   • BLDR Scan mLs 06/28/2023 2 ml   Final   Hospital Outpatient Visit on 06/21/2023   Component Date Value Ref Range Status   • GFR, POC 06/21/2023 >60  > or = 60 ml/min/1.73m2 = Normal Kidney Function Final         LAST HCT:    HCT (%)   Date Value   05/17/2023 38.4 (L)       LAST CREATININE:    Creatinine (mg/dL)   Date Value   04/28/2023 0.93       4/28/23 PSA= 1.50      PATHOLOGY     5/17/23   Pathologic Diagnosis   \"Right perirenal mass\"; CT-guided needle biopsy:   -Carcinoma, features compatible with urothelial primary  -Lymphovascular invasion identified  -See comment         IMAGING       LAST  CT:  === 06/21/23 ===    CT CHEST ABDOMEN PELVIS W CONTRAST    - Narrative -  EXAM:  CT CHEST ABDOMEN PELVIS W CONTRAST    CLINICAL INDICATION: Neoplasm of the right renal pelvis.  Malignant  neoplasm of the bladder.    COMPARISON:  April 2023    TECHNIQUE: Multiple axial images of the chest, abdomen and pelvis were  obtained following administration of intravenous contrast material.  In  addition, coronal and sagittal reformat images of the chest, abdomen and  pelvis were obtained. mA and/or kVp was adjusted for patient size.    CONTRAST: 75 mL of Omnipaque 350 was administered intravenously.  Approximately 15 mL of contrast infiltrated the right wrist area.  The area  was marked with compresses patient denies pain.  Instructions were given.    FINDINGS:  Evaluation of the mediastinum demonstrates some conglomerate  adenopathy in the prevascular space measuring 1.9 cm in short axis,  precarinal space measuring 19 mm.  AP window as well as the right  paratracheal region.  Aorta is mildly prominent ascending aorta measures  3.9 cm.    The lungs show no infiltrates or pleural effusions.  Emphysematous changes  are seen mostly in the upper lobes.  There is evidence of a pleural-based  lung nodule in the left lower lobe which measures 4.5 mm.  This may be a  scar.  Another 4 mm lung nodule is seen at the left lung base.  Follow-up  is recommended.    The liver radius, no calcified gallstones are seen.  Spleen, adrenal  glands and pancreas are unremarkable.  There is marked right-sided  hydronephrosis and perinephric stranding secondary to obstructive uropathy.  Hydronephrosis is worse compared to previous study.  Dense material is  seen in the proximal right ureter.  The possibility of a ureteral mass  should be considered.    The left kidney is unremarkable.    Constipation is seen.  No dilated bowel loops or bowel obstruction is  appreciated.  There is no abscess or free air.  Abdominal aorta is normal  in  caliber.    The bones show a bony lesion involving the left 11th rib posteriorly.  The  possibility of bone metastasis is difficult to exclude.  Correlation with  the bone scan may be helpful.    - Impression -  Significant adenopathy in the mediastinum as described above.  Two small lung nodules in the left lower lobe.  Require 3-6 months  follow-up.  No infiltrates or pleural effusions.  Severe emphysematous  changes.    There is progression of a right-sided hydronephrosis secondary to a mass  in the proximal right ureter.  Significant adenopathy seen in the upper  abdomen.  Aortocaval lymph node measures 16 mm, lymph node to the left of  the aorta measures 10 mm.  These are essentially stable since prior study.      Constipation.  No bowel obstruction.    A single bone lesion is seen involving the 11th rib on the left  posteriorly.  See discussion above      Electronically Signed by: SANDEEP AHUJA MD  Signed on: 6/21/2023 1:53 PM  Workstation ID: [id number]      === 05/17/23 ===    CT RETROPERITONEUM BIOPSY    - Narrative -  PROCEDURE: IMAGE GUIDED RETROPERITONEAL MASS BIOPSY.    INTERVENTIONALIST: Mamadou Bejarano M.D.  ASSISTANT(S): None    CLINICAL HISTORY:  PREPROCEDURE DIAGNOSIS: Soft tissue mass. Suspected neoplasia/infectious  process  INDICATION: 61 years old Male with right retroperitoneal mass obstructing  the right collecting system. The patient requires a biopsy for diagnostic  purposes.  POST PROCEDURE DIAGNOSIS: Same    - Impression -  Technically successful image guided biopsy of right  retroperitoneal mass.    PLAN: Please see pathology results for final diagnosis  ___________________________________________________________________________  _____________________________________    PROCEDURES PERFORMED:  Image Guided Access Of retroperitoneal mass    Core Biopsy  Conscious Sedation    ANESTHESIA/SEDATION: Moderate conscious sedation was administered with  continuous vital signs, pulse  oximetry, and ETCO2 monitoring by a  registered nurse who was present in the procedure room.  The sedation  process was conducted under the direct supervision of the physician  performing the procedure.  Sedation start at: 1316  Sedation end at: 1333    PROPHYLACTIC ANTIBIOTIC: None.    PREPARATION: The site was prepared and draped and all elements of maximal  sterile barrier technique including sterile gloves, sterile gown, mask,  large sterile sheet, hand hygiene and cutaneous antisepsis with [2%  chlorhexidine +70% isopropyl alcohol were used. Discussion of Risks,  Benefits and Alternatives completed with patient/authorized decision maker.  Additionally, potential problems related to recuperation, likelihood of  achieving care, treatment and service goals were discussed. Relevant risks,  benefits and side effects related to alternatives, including the possible  results of not receiving care, treatment and services discussed. When  indicated, any limitations on the confidentiality of information learned  from or about the patient were explained. All patient/authorized decision  maker questions answered and consent for procedure was provided. The  patient's identification, correct procedure and position were verified. The  appropriate site was verified and marked as appropriate. Equipment/Implants  were checked for functionality and availability.    PROCEDURE/FINDINGS  IMAGE GUIDED ACCESS: A needle was advanced into the lesion using CT  guidance after infiltration of the skin and deep tissues with local  anesthetic. Image demonstrated the tip of the needle within the target  area. An image was sent to the PACS for documentation purposes.      CORE BIOPSY: Then, core biopsies of the lesion of interest was performed  and given to the pathologist present in the room.  Needle: 18-gauge Sadia  Number of samples: 4  Additional description of procedure: Immediate cytologic evaluation of  touch prep identified diagnostic  cells.  Request was made for additional  sample for flow cytometry.    The needle was removed, manual compression was held for hemostasis, and a  sterile dressing was applied.    COMPLICATIONS: None    IMPLANTS: None    ESTIMATED BLOOD LOSS: Minimal    RADIATION/CONTRAST DOSE:  DLP: 475 mGy-cm2  CONTRAST DOSE: N/A      Electronically Signed by: THERESA CASTANEDA M.D.  Signed on: 5/17/2023 2:00 PM  Workstation ID: [id number]      === 04/28/23 ===    CT UROGRAM W WO CONTRAST    - Narrative -  EXAM:  CT UROGRAM W WO CONTRAST    CLINICAL INDICATION: Gross hematuria,    COMPARISON:  No previous exams available for review.    TECHNIQUE: Multiple axial images of the abdomen and pelvis were obtained  before and after administration of contrast material.  In addition, coronal  and sagittal reformat images of the abdomen and pelvis were obtained. mA  and/or kVp was adjusted for patient size.    CONTRAST: 125 mL of Omnipaque 350 was administered intravenously.    FINDINGS:  The lung bases show two lung nodules in the left lower lobe.  These are noncalcified and measures 6 mm and 6 mm respectively.  Short-term  follow-up in 3-6 months is suggested to assure stability.  No infiltrates  or pleural effusions are seen.  Heart is normal in size.    Precontrast images show right-sided hydronephrosis.  Cysts are seen in the  right kidney.    The liver appears homogeneous, gallbladder is unremarkable.  Spleen,  pancreas and adrenal glands are unremarkable.  Duplicated collecting system  on the right is seen.  There is evidence of a enhancing mass in the  retroperitoneum to the right of the inferior vena cava.  This measures 2.4  x 1.8 cm.  Both ureters of the right demonstrate abrupt cut off at this  level.  This may be secondary to adenopathy.    There is minimal intraperitoneal fat which limits evaluation for bowel  loops.  There is no evidence of bowel obstruction.  Constipation is seen.  No free air or abscess is  appreciated.    The left ureter is normal in course and caliber.  The distal right ureter  is not appreciated.  There is delayed pyelogram on the right secondary to  obstructive uropathy due to the retroperitoneal mass.    The bones show no acute changes.    - Impression -  Right retroperitoneal mass which appears to encase the two  ureters of the right kidney.  This may be an enlarged lymph node.  Transitional cell carcinoma is difficult to exclude.  Duplicated collecting  system on the right is seen.  There is at least moderate hydronephrosis on  the right more prominent in the lower moiety.    No stones or hydronephrosis is seen on the left.  Left ureter is  unremarkable.    Constipation.  No bowel obstruction.  Limited study as described above.  There is no evidence of abscess or free air.      Electronically Signed by: SANDEEP AHUJA MD  Signed on: 4/28/2023 2:30 PM  Workstation ID: [id number]    LAST X-RAY:  === 05/26/23 ===    XR RIBS 3 VIEWS LEFT W CHEST 1 VIEW    - Narrative -  EXAM: XR THORACIC SPINE 3 VIEWS, XR RIBS 3 VIEWS LEFT W CHEST 1 VIEW, XR  LUMBAR SPINE 2 OR 3 VIEWS    CLINICAL INDICATION: Back and rib pain    TECHNIQUE: 3 views of the thoracic spine. 3 views of the lumbar spine.  Frontal view the chest. 6 views of the left ribs.    COMPARISON: CT urogram 4/28/2023    FINDINGS:    Thoracic spine: 12 thoracic type vertebral bodies are normal in height. No  acute vertebral body collapse or significant vertebral body malalignment.  Mild thoracic dextrocurvature, centered at T7.    Mild multilevel thoracic spondylosis.    Moderate degenerative changes in the included cervical spine.    Lumbar spine: 5 lumbar type vertebral bodies are normal in height. No acute  vertebral body collapse.    Straightening of lumbar lordosis. Mild retrolisthesis of L3 over L4, L4  over L5, and L5 over S1, similar to prior CT urogram (allowing for  differences in modality). Mild lumbar levocurvature, centered at  L3-L4.    Moderate lumbar spondylosis appears unchanged. Advanced facet arthropathy  on the left at L5-S1.    Sacroiliac joints appear within normal limits.    Chest and left RIBS:    Frontal view the chest demonstrates normal size of the cardiac silhouette.  No pleural effusion or pneumothorax. No dense focal consolidation. Mild  bilateral suprahilar linear densities, probably atelectasis.    Mildly ectatic appearance of the aortic arch.    No acute displaced left rib fracture.    - Impression -  Thoracic spine:  1. Mild thoracic dextrocurvature with mild multilevel spondylosis.  2. Moderate degenerative changes in the included cervical spine.    Lumbar spine: Straightening of lumbar lordosis with mild lumbar  levocurvature and moderate lumbar spondylosis.    Chest and left RIBS:  1. No acute displaced left rib fracture.  2. Mildly ectatic appearance of the aortic arch. A follow-up CT chest is  suggested to assess aortic caliber. This may be obtained on an outpatient  basis, if clinically deemed appropriate.    Electronically Signed by: DOROTHY ANDUJAR M.D.  Signed on: 5/26/2023 7:54 AM  Workstation ID: [id number]    ___________________________________________________________________________    XR LUMBAR SPINE 2 OR 3 VIEWS    - Narrative -  EXAM: XR THORACIC SPINE 3 VIEWS, XR RIBS 3 VIEWS LEFT W CHEST 1 VIEW, XR  LUMBAR SPINE 2 OR 3 VIEWS    CLINICAL INDICATION: Back and rib pain    TECHNIQUE: 3 views of the thoracic spine. 3 views of the lumbar spine.  Frontal view the chest. 6 views of the left ribs.    COMPARISON: CT urogram 4/28/2023    FINDINGS:    Thoracic spine: 12 thoracic type vertebral bodies are normal in height. No  acute vertebral body collapse or significant vertebral body malalignment.  Mild thoracic dextrocurvature, centered at T7.    Mild multilevel thoracic spondylosis.    Moderate degenerative changes in the included cervical spine.    Lumbar spine: 5 lumbar type vertebral bodies are normal  in height. No acute  vertebral body collapse.    Straightening of lumbar lordosis. Mild retrolisthesis of L3 over L4, L4  over L5, and L5 over S1, similar to prior CT urogram (allowing for  differences in modality). Mild lumbar levocurvature, centered at L3-L4.    Moderate lumbar spondylosis appears unchanged. Advanced facet arthropathy  on the left at L5-S1.    Sacroiliac joints appear within normal limits.    Chest and left RIBS:    Frontal view the chest demonstrates normal size of the cardiac silhouette.  No pleural effusion or pneumothorax. No dense focal consolidation. Mild  bilateral suprahilar linear densities, probably atelectasis.    Mildly ectatic appearance of the aortic arch.    No acute displaced left rib fracture.    - Impression -  Thoracic spine:  1. Mild thoracic dextrocurvature with mild multilevel spondylosis.  2. Moderate degenerative changes in the included cervical spine.    Lumbar spine: Straightening of lumbar lordosis with mild lumbar  levocurvature and moderate lumbar spondylosis.    Chest and left RIBS:  1. No acute displaced left rib fracture.  2. Mildly ectatic appearance of the aortic arch. A follow-up CT chest is  suggested to assess aortic caliber. This may be obtained on an outpatient  basis, if clinically deemed appropriate.    Electronically Signed by: DOROTHY ANDUJAR M.D.  Signed on: 5/26/2023 7:54 AM  Workstation ID: [id number]    ___________________________________________________________________________    XR THORACIC SPINE 3 VIEWS    - Narrative -  EXAM: XR THORACIC SPINE 3 VIEWS, XR RIBS 3 VIEWS LEFT W CHEST 1 VIEW, XR  LUMBAR SPINE 2 OR 3 VIEWS    CLINICAL INDICATION: Back and rib pain    TECHNIQUE: 3 views of the thoracic spine. 3 views of the lumbar spine.  Frontal view the chest. 6 views of the left ribs.    COMPARISON: CT urogram 4/28/2023    FINDINGS:    Thoracic spine: 12 thoracic type vertebral bodies are normal in height. No  acute vertebral body collapse or  significant vertebral body malalignment.  Mild thoracic dextrocurvature, centered at T7.    Mild multilevel thoracic spondylosis.    Moderate degenerative changes in the included cervical spine.    Lumbar spine: 5 lumbar type vertebral bodies are normal in height. No acute  vertebral body collapse.    Straightening of lumbar lordosis. Mild retrolisthesis of L3 over L4, L4  over L5, and L5 over S1, similar to prior CT urogram (allowing for  differences in modality). Mild lumbar levocurvature, centered at L3-L4.    Moderate lumbar spondylosis appears unchanged. Advanced facet arthropathy  on the left at L5-S1.    Sacroiliac joints appear within normal limits.    Chest and left RIBS:    Frontal view the chest demonstrates normal size of the cardiac silhouette.  No pleural effusion or pneumothorax. No dense focal consolidation. Mild  bilateral suprahilar linear densities, probably atelectasis.    Mildly ectatic appearance of the aortic arch.    No acute displaced left rib fracture.    - Impression -  Thoracic spine:  1. Mild thoracic dextrocurvature with mild multilevel spondylosis.  2. Moderate degenerative changes in the included cervical spine.    Lumbar spine: Straightening of lumbar lordosis with mild lumbar  levocurvature and moderate lumbar spondylosis.    Chest and left RIBS:  1. No acute displaced left rib fracture.  2. Mildly ectatic appearance of the aortic arch. A follow-up CT chest is  suggested to assess aortic caliber. This may be obtained on an outpatient  basis, if clinically deemed appropriate.    Electronically Signed by: DOROTHY ANDUJAR M.D.  Signed on: 5/26/2023 7:54 AM  Workstation ID: [id number]    LAST U/S:  No results found for this or any previous visit.      ASSESSMENT     1. Frequency of urination    2. Retroperitoneal mass        -UA is negative   -PVR is 2 ml     -Outside urology records reviewed    -Patient is currently under the care of oncologist Dr. Palafox and has started chemo  3-4 weeks ago.    -Tumor is outside of the kidney, obstructing the ureter on the right side.    -Patient has metastasis away from there in other parts of the body.    -Patient denies any pain or bothersome voiding symptoms.    -Discussed the possibility of placing ureteral stent but patient does not have pain.    -Do not believe there is any surgery that I can perform to solve this problem.     -Called and spoke with Dr. Palafox personally:  Patient has metastatic disease involving a right retroperitoneal mass  Undergoing chemotherapy  No plans for surgical intervention at this time   Patient has no urological needs at this time  Patient will return to our office PRN        PLAN       Orders Placed This Encounter   • POCT Urine Dip Non-Auto   • POCT POST VOID RES URINE/BL BY US          Return if symptoms worsen or fail to improve.      _____________________________________________________      Scribe Signature:  I, Franny Parson CMA, personally scribed the services dictated to me by Jay Dunn M.D. in this documentation.Scribe Attestation:  The documentation recorded by the scribe accurately reflects the service I personally performed and the decision made by me, Jay Dunn MD.        Total time spent today on this visit  is  >  minutes which includes preparing to see the patient by reviewing prior records, obtaining and reviewing history, performing a physical exam, counseling the patient, documenting clinical information in the medical record, ordering medications/tests/procedures, communicating with other health care professionals, communicating test results to the patient and coordinating care.     Unknown

## 2023-06-29 ENCOUNTER — APPOINTMENT (OUTPATIENT)
Dept: OBGYN | Facility: CLINIC | Age: 28
End: 2023-06-29
Payer: MEDICAID

## 2023-06-29 VITALS
SYSTOLIC BLOOD PRESSURE: 94 MMHG | DIASTOLIC BLOOD PRESSURE: 60 MMHG | WEIGHT: 142 LBS | BODY MASS INDEX: 25.16 KG/M2 | HEIGHT: 63 IN

## 2023-06-29 DIAGNOSIS — Z23 ENCOUNTER FOR IMMUNIZATION: ICD-10-CM

## 2023-06-29 PROCEDURE — 90471 IMMUNIZATION ADMIN: CPT

## 2023-06-29 PROCEDURE — 90715 TDAP VACCINE 7 YRS/> IM: CPT

## 2023-06-29 PROCEDURE — 0502F SUBSEQUENT PRENATAL CARE: CPT

## 2023-06-29 PROCEDURE — 90472 IMMUNIZATION ADMIN EACH ADD: CPT

## 2023-07-19 ENCOUNTER — APPOINTMENT (OUTPATIENT)
Dept: ANTEPARTUM | Facility: CLINIC | Age: 28
End: 2023-07-19
Payer: MEDICAID

## 2023-07-19 ENCOUNTER — APPOINTMENT (OUTPATIENT)
Dept: OBGYN | Facility: CLINIC | Age: 28
End: 2023-07-19
Payer: MEDICAID

## 2023-07-19 VITALS
BODY MASS INDEX: 24.8 KG/M2 | WEIGHT: 140 LBS | SYSTOLIC BLOOD PRESSURE: 102 MMHG | HEIGHT: 63 IN | DIASTOLIC BLOOD PRESSURE: 64 MMHG

## 2023-07-19 PROCEDURE — 76819 FETAL BIOPHYS PROFIL W/O NST: CPT

## 2023-07-19 PROCEDURE — 76816 OB US FOLLOW-UP PER FETUS: CPT

## 2023-07-19 PROCEDURE — 0502F SUBSEQUENT PRENATAL CARE: CPT

## 2023-08-02 ENCOUNTER — APPOINTMENT (OUTPATIENT)
Dept: OBGYN | Facility: CLINIC | Age: 28
End: 2023-08-02
Payer: MEDICAID

## 2023-08-02 VITALS
HEIGHT: 63 IN | SYSTOLIC BLOOD PRESSURE: 104 MMHG | WEIGHT: 144 LBS | BODY MASS INDEX: 25.52 KG/M2 | DIASTOLIC BLOOD PRESSURE: 64 MMHG

## 2023-08-02 PROCEDURE — 0502F SUBSEQUENT PRENATAL CARE: CPT

## 2023-08-13 ENCOUNTER — OUTPATIENT (OUTPATIENT)
Dept: OUTPATIENT SERVICES | Facility: HOSPITAL | Age: 28
LOS: 1 days | Discharge: HOME | End: 2023-08-13
Payer: MEDICAID

## 2023-08-13 VITALS — DIASTOLIC BLOOD PRESSURE: 55 MMHG | SYSTOLIC BLOOD PRESSURE: 95 MMHG | HEART RATE: 61 BPM

## 2023-08-13 VITALS
HEART RATE: 63 BPM | DIASTOLIC BLOOD PRESSURE: 50 MMHG | RESPIRATION RATE: 16 BRPM | SYSTOLIC BLOOD PRESSURE: 96 MMHG | TEMPERATURE: 98 F

## 2023-08-13 DIAGNOSIS — O26.899 OTHER SPECIFIED PREGNANCY RELATED CONDITIONS, UNSPECIFIED TRIMESTER: ICD-10-CM

## 2023-08-13 PROCEDURE — 99221 1ST HOSP IP/OBS SF/LOW 40: CPT | Mod: 25

## 2023-08-13 PROCEDURE — 59025 FETAL NON-STRESS TEST: CPT | Mod: 26

## 2023-08-13 NOTE — OB PROVIDER TRIAGE NOTE - NSOBPROVIDERNOTE_OBGYN_ALL_OB_FT
NST  BPP  oral hydration  declines tylenol for discomfort, denies anything for nausea    d/w  Sample d/c home at 35.4 wks no evidence of  labor  maternal and fetal surveillance reassuring  rest activity as tolerated  increase water intake   labor precautions instructed  keep all OB appointments  may use maternity band for abdominal support while standing  return for vaginal bleeding, leakage of fluid, decreased fetal movement or any concerns  v/w discharge instructions given with verbal understanding by patient NST  BPP  oral hydration  declines tylenol for discomfort, denies anything for nausea    d/w  Sample d/c home at 35.4 wks no evidence of  labor  maternal and fetal surveillance reassuring  rest activity as tolerated  increase water intake   labor precautions instructed  keep all OB appointments  may use maternity band for abdominal support while standing  return for vaginal bleeding, leakage of fluid, decreased fetal movement or any concerns  v/w discharge instructions given with verbal understanding by patient  discharged @[id number]

## 2023-08-13 NOTE — OB RN TRIAGE NOTE - FALL HARM RISK - UNIVERSAL INTERVENTIONS
Bed in lowest position, wheels locked, appropriate side rails in place/Call bell, personal items and telephone in reach/Instruct patient to call for assistance before getting out of bed or chair/Non-slip footwear when patient is out of bed/[place] to call system/Physically safe environment - no spills, clutter or unnecessary equipment/Purposeful Proactive Rounding/Room/bathroom lighting operational, light cord in reach

## 2023-08-13 NOTE — OB PROVIDER TRIAGE NOTE - PLAN OF CARE
d/w  Sample d/c home at 35.4 wks no evidence of  labor  maternal and fetal surveillance reassuring  rest activity as tolerated  increase water intake   labor precautions instructed  keep all OB appointments  may use maternity band for abdominal support while standing  return for vaginal bleeding, leakage of fluid, decreased fetal movement or any concerns  v/w discharge instructions given with verbal understanding by patient

## 2023-08-13 NOTE — OB PROVIDER TRIAGE NOTE - HISTORY OF PRESENT ILLNESS
26 yo  @ 35.4 wks lower abd pain coming and going, back pain with nausea and 3 episodes of diarrhea. did not take any tylenol, tolerating liquids not eating as much today 2/2 nausea. denies vb or lof +GFM. AP course uncomplicated thus far. denies fever chills dysuria constipation new swelling epigastric pain vision changes cp sob or cough. denies sick contacts eating out. last saw OB 2 weeks ago, next appointment Thursday.  * pt reports the office told her she would be 38 wks this coming visit, pt reports LMP in December unsure date, EDC is 23 and has not changed as per patient. according to EDC pt is 35.4 wks not almost 38 wks.  GBS: not obtained yet  meds:PNV  All: PCN rash  PMH: denies  PSH: denies  gyn hx: denies  ob hx: 10/25/2019 7#8   2020 7#8   10/8/2021 7#8

## 2023-08-13 NOTE — OB RN TRIAGE NOTE - NS_TRIAGETIMEOFATTENDNOTIFICATION_OBGYN_ALL_OB_DT
----- Message from Renetta Walton MD sent at 2/20/2021 10:12 PM CST -----  a1c trending up but still close to goal. Tighten up the diet would be my rec for now, can consider increasing metformin otherwise   13-Aug-2023 21:08

## 2023-08-13 NOTE — OB PROVIDER TRIAGE NOTE - NSHPPHYSICALEXAM_GEN_ALL_CORE
abd soft gravid NT  Cv RRR  LS clear bilaterally  TAS: images saved on ASOB  breech* fetal head maternal right middle quad. pt reports fetus was head down 2 weeks ago  BPP 8/8  ADRIANA: 12.3  +FM   SVE: 0.5/30/-3  FHT: reactive, moderate variability, + accelerations negative decelerations  toco: occasional  Vital Signs Last 24 Hrs  T(C): 36.7 (13 Aug 2023 21:25), Max: 36.7 (13 Aug 2023 21:25)  T(F): 98.06 (13 Aug 2023 21:25), Max: 98.06 (13 Aug 2023 21:25)  HR: 61 (13 Aug 2023 21:26) (56 - 67)  BP: 95/55 (13 Aug 2023 21:26) (89/47 - 97/53)  BP(mean): --  RR: 15 (13 Aug 2023 21:25) (15 - 16)  SpO2: --

## 2023-08-14 NOTE — OB PROVIDER H&P - PROBLEM SELECTOR PROBLEM 1
sepsis Intrauterine growth restriction (IUGR) affecting care of mother, third trimester, single or unspecified fetus

## 2023-08-15 DIAGNOSIS — O47.03 FALSE LABOR BEFORE 37 COMPLETED WEEKS OF GESTATION, THIRD TRIMESTER: ICD-10-CM

## 2023-08-15 DIAGNOSIS — O26.893 OTHER SPECIFIED PREGNANCY RELATED CONDITIONS, THIRD TRIMESTER: ICD-10-CM

## 2023-08-15 DIAGNOSIS — R11.0 NAUSEA: ICD-10-CM

## 2023-08-15 DIAGNOSIS — R19.7 DIARRHEA, UNSPECIFIED: ICD-10-CM

## 2023-08-15 DIAGNOSIS — Z3A.35 35 WEEKS GESTATION OF PREGNANCY: ICD-10-CM

## 2023-08-17 ENCOUNTER — APPOINTMENT (OUTPATIENT)
Dept: OBGYN | Facility: CLINIC | Age: 28
End: 2023-08-17
Payer: MEDICAID

## 2023-08-17 VITALS — BODY MASS INDEX: 26.22 KG/M2 | WEIGHT: 148 LBS | SYSTOLIC BLOOD PRESSURE: 101 MMHG | DIASTOLIC BLOOD PRESSURE: 64 MMHG

## 2023-08-17 PROCEDURE — 0502F SUBSEQUENT PRENATAL CARE: CPT

## 2023-08-24 ENCOUNTER — APPOINTMENT (OUTPATIENT)
Dept: OBGYN | Facility: CLINIC | Age: 28
End: 2023-08-24
Payer: MEDICAID

## 2023-08-24 VITALS
HEIGHT: 63 IN | WEIGHT: 150 LBS | DIASTOLIC BLOOD PRESSURE: 64 MMHG | SYSTOLIC BLOOD PRESSURE: 102 MMHG | BODY MASS INDEX: 26.58 KG/M2

## 2023-08-24 PROCEDURE — 0502F SUBSEQUENT PRENATAL CARE: CPT

## 2023-08-27 LAB
B-HEM STREP SPEC QL CULT: NORMAL
HIV1+2 AB SPEC QL IA.RAPID: NONREACTIVE

## 2023-08-30 ENCOUNTER — NON-APPOINTMENT (OUTPATIENT)
Age: 28
End: 2023-08-30

## 2023-08-31 ENCOUNTER — APPOINTMENT (OUTPATIENT)
Dept: OBGYN | Facility: CLINIC | Age: 28
End: 2023-08-31
Payer: MEDICAID

## 2023-08-31 VITALS
DIASTOLIC BLOOD PRESSURE: 59 MMHG | HEIGHT: 63 IN | WEIGHT: 147 LBS | BODY MASS INDEX: 26.05 KG/M2 | SYSTOLIC BLOOD PRESSURE: 97 MMHG

## 2023-08-31 PROCEDURE — 0502F SUBSEQUENT PRENATAL CARE: CPT

## 2023-09-07 ENCOUNTER — APPOINTMENT (OUTPATIENT)
Dept: OBGYN | Facility: CLINIC | Age: 28
End: 2023-09-07
Payer: MEDICAID

## 2023-09-07 ENCOUNTER — APPOINTMENT (OUTPATIENT)
Dept: ANTEPARTUM | Facility: CLINIC | Age: 28
End: 2023-09-07
Payer: MEDICAID

## 2023-09-07 VITALS — DIASTOLIC BLOOD PRESSURE: 67 MMHG | BODY MASS INDEX: 26.75 KG/M2 | SYSTOLIC BLOOD PRESSURE: 101 MMHG | WEIGHT: 151 LBS

## 2023-09-07 PROCEDURE — 76819 FETAL BIOPHYS PROFIL W/O NST: CPT

## 2023-09-07 PROCEDURE — 76816 OB US FOLLOW-UP PER FETUS: CPT

## 2023-09-07 PROCEDURE — 0502F SUBSEQUENT PRENATAL CARE: CPT

## 2023-09-10 ENCOUNTER — OUTPATIENT (OUTPATIENT)
Dept: INPATIENT UNIT | Facility: HOSPITAL | Age: 28
LOS: 1 days | Discharge: HOME | End: 2023-09-10
Payer: MEDICAID

## 2023-09-10 VITALS — DIASTOLIC BLOOD PRESSURE: 50 MMHG | HEART RATE: 61 BPM | SYSTOLIC BLOOD PRESSURE: 97 MMHG

## 2023-09-10 VITALS — TEMPERATURE: 98 F

## 2023-09-10 DIAGNOSIS — O26.899 OTHER SPECIFIED PREGNANCY RELATED CONDITIONS, UNSPECIFIED TRIMESTER: ICD-10-CM

## 2023-09-10 PROCEDURE — 99212 OFFICE O/P EST SF 10 MIN: CPT | Mod: 25

## 2023-09-10 PROCEDURE — 59025 FETAL NON-STRESS TEST: CPT | Mod: 26

## 2023-09-10 NOTE — OB PROVIDER TRIAGE NOTE - NSOBPROVIDERNOTE_OBGYN_ALL_OB_FT
BPP 8/8, vertex, ADRIANA 9.58  cervix long/closed  NST reactive, infrequent irregular  matilda  defers pain meds     no evidence active labor  maternal/fetal surveillance reassuring  d/w Dr Hdez: discharge home  follow up with OB provider as scheduled Thursday 9/14/23  increase fluid hydration.  reviewed fetal kick count and labor precautions  call OB/return to triage with change in symptoms and or concerns such as decreased fetal movement, leakage of fluid, vaginal bleeding, contractions, pain, fever, headache, blurry vision.  addressed questions and concerns  reviewed labor precautions and discharge papers. pt verbalized understanding and signed.    Discharged 21:00 27 year old female  @ 38.6GA with SLIUP, uncomplicated PNC, presents for rule out labor and requets elective induction  NST reactive, infrequent irregular  matilda  BPP 8/8, vertex, ADRIANA 9.58  cervix long/closed  defers pain meds     no evidence active labor  maternal/fetal surveillance reassuring  d/w Dr Hdez: discharge home  follow up with OB provider as scheduled 23  increase fluid hydration.  reviewed fetal kick count and labor precautions  call OB/return to triage with change in symptoms and or concerns such as decreased fetal movement, leakage of fluid, vaginal bleeding, contractions, pain, fever, headache, blurry vision.  addressed questions and concerns  reviewed labor precautions and discharge papers. pt verbalized understanding and signed.    Discharged 21:00

## 2023-09-10 NOTE — OB PROVIDER TRIAGE NOTE - ADDITIONAL INSTRUCTIONS
follow up with OB provider as scheduled Thursday 9/14/23  increase fluid hydration.  reviewed fetal kick count and labor precautions  call OB/return to triage with change in symptoms and or concerns such as decreased fetal movement, leakage of fluid, vaginal bleeding, contractions, pain, fever, headache, blurry vision.

## 2023-09-10 NOTE — OB PROVIDER TRIAGE NOTE - NSICDXPASTMEDICALHX_GEN_ALL_CORE_FT
Cystoscopy    Date/Time: 4/18/2022 1:54 PM  Performed by: Roe Faulkner MD  Authorized by: Roe Faulkner MD     Consent Done?:  Yes (Written)  Timeout: prior to procedure the correct patient, procedure, and site was verified    Prep: patient was prepped and draped in usual sterile fashion    Local anesthesia used?: Yes    Anesthesia:  Lidocaine jelly  Indications: history bladder cancer    Anesthesia:  Lidocaine jelly  Patient sedated?: No    Preparation: Patient was prepped and draped in usual sterile fashion    Scope type:  Flexible cystoscope  External exam normal: Yes    Urethra normal: Yes    Prostate normal: Yes    Bladder neck normal: Yes    Bladder normal: Yes     patient tolerated the procedure well with no immediate complications  Comments:         PUNLMP BOB     Cytology sent today  If cytology OK, return for Cysto and cytology 1 yr  RTC sooner if any hematuria or other symptoms       PAST MEDICAL HISTORY:  No pertinent past medical history

## 2023-09-10 NOTE — OB PROVIDER TRIAGE NOTE - NSHPPHYSICALEXAM_GEN_ALL_CORE
Vital Signs Last 24 Hrs  T(C): 36.9 (10 Sep 2023 20:20), Max: 36.9 (10 Sep 2023 20:15)  T(F): 98.4 (10 Sep 2023 20:20), Max: 98.42 (10 Sep 2023 20:15)  HR: 61 (10 Sep 2023 20:56) (61 - 71)  BP: 97/50 (10 Sep 2023 20:56) (97/50 - 111/51)  BP(mean): --  RR: 16 (10 Sep 2023 20:20) (16 - 16)  SpO2: --    gen: a/o x 3, NAD  pulm: breathing unlabored on room air  abd: soft and nontender, gravid  SVE: 0/0/-3, intact  TAS: vertex by sono. anterior placenta. ADRIANA 9.58. m wave  bpm. BPP 8/8. image saved in ASOB  EFM: NST reactive   TOCO: irregular infrequent contractions

## 2023-09-10 NOTE — OB RN TRIAGE NOTE - FALL HARM RISK - UNIVERSAL INTERVENTIONS
Bed in lowest position, wheels locked, appropriate side rails in place/Call bell, personal items and telephone in reach/Instruct patient to call for assistance before getting out of bed or chair/Non-slip footwear when patient is out of bed/[place] to call system/Physically safe environment - no spills, clutter or unnecessary equipment/Purposeful Proactive Rounding/Room/bathroom lighting operational, light cord in reach

## 2023-09-10 NOTE — OB PROVIDER TRIAGE NOTE - PRINCIPAL DIAGNOSIS
48F with hidadrenitis suppurativa presenting with headache x 1 week. Pt states that she has been taking naprosyn without complete relief. Seems to mitigate the pain but has been having a slight throbbing pain to head that's been persistent. Has visited EDs in the past for these unremitting headaches and been given Toradol with relief. Has seen neurologists in the past with intermittent diagnosis. No associated nausea, vomiting, or weakness. Last CT Head last year with no abnormalities noted as per patient. Denies f/c, chest pain, SOB, abd pain, dysuria. False labor

## 2023-09-10 NOTE — OB PROVIDER TRIAGE NOTE - HISTORY OF PRESENT ILLNESS
27 year old female  @ 38.6GA with SLIUP, uncomplicated PNC, presents to triage c/o "feeling uncomfortable would like an induction." Pt reports contractions felt yesterday but since resolved, rates 7/10, defers pain meds. reports GFM. denies LOF, VB, HA, N/V/D, fever, chills.   PNC with Dr Rocha - last visit , next visit   GBS neg 23  EFW 7#5

## 2023-09-12 ENCOUNTER — APPOINTMENT (OUTPATIENT)
Dept: OBGYN | Facility: CLINIC | Age: 28
End: 2023-09-12
Payer: MEDICAID

## 2023-09-12 VITALS
HEIGHT: 63 IN | SYSTOLIC BLOOD PRESSURE: 98 MMHG | BODY MASS INDEX: 26.75 KG/M2 | DIASTOLIC BLOOD PRESSURE: 61 MMHG | WEIGHT: 151 LBS

## 2023-09-12 DIAGNOSIS — O47.1 FALSE LABOR AT OR AFTER 37 COMPLETED WEEKS OF GESTATION: ICD-10-CM

## 2023-09-12 DIAGNOSIS — Z3A.38 38 WEEKS GESTATION OF PREGNANCY: ICD-10-CM

## 2023-09-12 PROCEDURE — 0502F SUBSEQUENT PRENATAL CARE: CPT

## 2023-09-18 ENCOUNTER — INPATIENT (INPATIENT)
Facility: HOSPITAL | Age: 28
LOS: 1 days | Discharge: HOME | End: 2023-09-20
Attending: OBSTETRICS & GYNECOLOGY | Admitting: OBSTETRICS & GYNECOLOGY
Payer: MEDICAID

## 2023-09-18 ENCOUNTER — APPOINTMENT (OUTPATIENT)
Dept: ANTEPARTUM | Facility: CLINIC | Age: 28
End: 2023-09-18

## 2023-09-18 ENCOUNTER — TRANSCRIPTION ENCOUNTER (OUTPATIENT)
Age: 28
End: 2023-09-18

## 2023-09-18 ENCOUNTER — OUTPATIENT (OUTPATIENT)
Dept: INPATIENT UNIT | Facility: HOSPITAL | Age: 28
LOS: 1 days | Discharge: HOME | End: 2023-09-18
Payer: MEDICAID

## 2023-09-18 VITALS — TEMPERATURE: 98 F | RESPIRATION RATE: 16 BRPM

## 2023-09-18 VITALS
TEMPERATURE: 98 F | HEART RATE: 77 BPM | RESPIRATION RATE: 17 BRPM | SYSTOLIC BLOOD PRESSURE: 108 MMHG | DIASTOLIC BLOOD PRESSURE: 58 MMHG

## 2023-09-18 VITALS — HEART RATE: 72 BPM | SYSTOLIC BLOOD PRESSURE: 105 MMHG | DIASTOLIC BLOOD PRESSURE: 55 MMHG

## 2023-09-18 DIAGNOSIS — O26.899 OTHER SPECIFIED PREGNANCY RELATED CONDITIONS, UNSPECIFIED TRIMESTER: ICD-10-CM

## 2023-09-18 LAB
BASOPHILS # BLD AUTO: 0.02 K/UL — SIGNIFICANT CHANGE UP (ref 0–0.2)
BASOPHILS NFR BLD AUTO: 0.3 % — SIGNIFICANT CHANGE UP (ref 0–2)
BLD GP AB SCN SERPL QL: NEGATIVE — SIGNIFICANT CHANGE UP
EOSINOPHIL # BLD AUTO: 0.05 K/UL — SIGNIFICANT CHANGE UP (ref 0–0.5)
EOSINOPHIL NFR BLD AUTO: 0.6 % — SIGNIFICANT CHANGE UP (ref 0–6)
HCT VFR BLD CALC: 33.1 % — LOW (ref 34.5–45)
HGB BLD-MCNC: 10.3 G/DL — LOW (ref 11.5–15.5)
IANC: 5.02 K/UL — SIGNIFICANT CHANGE UP (ref 1.8–7.4)
IMM GRANULOCYTES NFR BLD AUTO: 0.5 % — SIGNIFICANT CHANGE UP (ref 0–0.9)
LYMPHOCYTES # BLD AUTO: 2.19 K/UL — SIGNIFICANT CHANGE UP (ref 1–3.3)
LYMPHOCYTES # BLD AUTO: 28.1 % — SIGNIFICANT CHANGE UP (ref 13–44)
MCHC RBC-ENTMCNC: 24.5 PG — LOW (ref 27–34)
MCHC RBC-ENTMCNC: 31.1 GM/DL — LOW (ref 32–36)
MCV RBC AUTO: 78.8 FL — LOW (ref 80–100)
MONOCYTES # BLD AUTO: 0.48 K/UL — SIGNIFICANT CHANGE UP (ref 0–0.9)
MONOCYTES NFR BLD AUTO: 6.2 % — SIGNIFICANT CHANGE UP (ref 2–14)
NEUTROPHILS # BLD AUTO: 5.02 K/UL — SIGNIFICANT CHANGE UP (ref 1.8–7.4)
NEUTROPHILS NFR BLD AUTO: 64.3 % — SIGNIFICANT CHANGE UP (ref 43–77)
NRBC # BLD: 0 /100 WBCS — SIGNIFICANT CHANGE UP (ref 0–0)
NRBC # FLD: 0 K/UL — SIGNIFICANT CHANGE UP (ref 0–0)
PLATELET # BLD AUTO: 174 K/UL — SIGNIFICANT CHANGE UP (ref 150–400)
RBC # BLD: 4.2 M/UL — SIGNIFICANT CHANGE UP (ref 3.8–5.2)
RBC # FLD: 15.5 % — HIGH (ref 10.3–14.5)
RH IG SCN BLD-IMP: POSITIVE — SIGNIFICANT CHANGE UP
WBC # BLD: 7.8 K/UL — SIGNIFICANT CHANGE UP (ref 3.8–10.5)
WBC # FLD AUTO: 7.8 K/UL — SIGNIFICANT CHANGE UP (ref 3.8–10.5)

## 2023-09-18 PROCEDURE — 59025 FETAL NON-STRESS TEST: CPT | Mod: 26

## 2023-09-18 PROCEDURE — 99221 1ST HOSP IP/OBS SF/LOW 40: CPT | Mod: 25

## 2023-09-18 RX ORDER — CITRIC ACID/SODIUM CITRATE 300-500 MG
15 SOLUTION, ORAL ORAL EVERY 6 HOURS
Refills: 0 | Status: DISCONTINUED | OUTPATIENT
Start: 2023-09-18 | End: 2023-09-19

## 2023-09-18 RX ORDER — INFLUENZA VIRUS VACCINE 15; 15; 15; 15 UG/.5ML; UG/.5ML; UG/.5ML; UG/.5ML
0.5 SUSPENSION INTRAMUSCULAR ONCE
Refills: 0 | Status: DISCONTINUED | OUTPATIENT
Start: 2023-09-18 | End: 2023-09-20

## 2023-09-18 RX ORDER — OXYTOCIN 10 UNIT/ML
333.33 VIAL (ML) INJECTION
Qty: 20 | Refills: 0 | Status: DISCONTINUED | OUTPATIENT
Start: 2023-09-18 | End: 2023-09-19

## 2023-09-18 RX ORDER — SODIUM CHLORIDE 9 MG/ML
1000 INJECTION, SOLUTION INTRAVENOUS
Refills: 0 | Status: DISCONTINUED | OUTPATIENT
Start: 2023-09-18 | End: 2023-09-19

## 2023-09-18 RX ORDER — CHLORHEXIDINE GLUCONATE 213 G/1000ML
1 SOLUTION TOPICAL DAILY
Refills: 0 | Status: DISCONTINUED | OUTPATIENT
Start: 2023-09-18 | End: 2023-09-19

## 2023-09-18 RX ORDER — OXYTOCIN 10 UNIT/ML
VIAL (ML) INJECTION
Qty: 30 | Refills: 0 | Status: DISCONTINUED | OUTPATIENT
Start: 2023-09-18 | End: 2023-09-19

## 2023-09-18 NOTE — OB PROVIDER TRIAGE NOTE - NSOBPROVIDERNOTE_OBGYN_ALL_OB_FT
27y female  @40.0w, admit for induction of labor.    - Admit to Labor and Delivery  - Continuous EFM  - Clear diet, IV fluids  - Consent signed  - Standard labs (T&S, CBC, RPR)  - Epidural PRN  - Induction/augmentation agent: pitocin  - Discussed with Dr. Damon

## 2023-09-18 NOTE — OB PROVIDER TRIAGE NOTE - NSOBPROVIDERNOTE_OBGYN_ALL_OB_FT
27 year old female P3 at 39.6 weeks  latent labor      case d/w Dr Rocha     cleared for discharge      - Patient to be discharged home with follow up and return precautions  - Please follow up with your obstetrician at your next scheduled appointment.   - Please return for decreased/no fetal movement, vaginal bleeding similar to that of a period, leaking/gush of fluid, regular contractions occurring 4-5 minutes for one hour or requiring pain medication   - Patient and partner and educated of plan and demonstrate understanding. All questions answered. Discharge instructions provided and signed.   - Discharged at __[id number]_

## 2023-09-18 NOTE — OB PROVIDER TRIAGE NOTE - NSHPPHYSICALEXAM_GEN_ALL_CORE
T(C): 36.5 (09-18-23 @ 21:41), Max: 36.5 (09-18-23 @ 11:11)  HR: 63 (09-18-23 @ 21:42) (63 - 77)  BP: 100/50 (09-18-23 @ 21:42) (100/50 - 108/58)  RR: 16 (09-18-23 @ 21:41) (16 - 17)  SpO2: --  – PE:   CV: RRR  Pulm: breathing comfortably on RA  Abd: soft, gravid, nontender  Extr: moving all extremities with ease  TAUS: images saved in ASOB, vertex position, anterior placenta, M mode: 140 FHR  NST: baseline 135 FHR, moderate variability, + accels, - decels, reactive

## 2023-09-18 NOTE — OB PROVIDER TRIAGE NOTE - NSHPPHYSICALEXAM_GEN_ALL_CORE
pt seen and examined    ICU Vital Signs Last 24 Hrs  T(C): 36.5 (18 Sep 2023 11:11), Max: 36.5 (18 Sep 2023 11:11)  T(F): 97.7 (18 Sep 2023 11:11), Max: 97.7 (18 Sep 2023 11:11)  HR: 72 (18 Sep 2023 12:51) (67 - 77)  BP: 105/55 (18 Sep 2023 12:51) (101/57 - 108/58)  BP(mean): --  ABP: --  ABP(mean): --  RR: 17 (18 Sep 2023 11:11) (17 - 17)  SpO2: --  pt in NAD   lungs clear   heart s1 s2   abd soft gravid  non tender   placed on EFM    NST  reactive     with acceleration  no deceleration   irregular contractions   Scan  vertex ADRIANA 18 BPP 8/8  anterior placenta   images saved to ASOB

## 2023-09-18 NOTE — OB RN PATIENT PROFILE - FALL HARM RISK - UNIVERSAL INTERVENTIONS
Bed in lowest position, wheels locked, appropriate side rails in place/Call bell, personal items and telephone in reach/Instruct patient to call for assistance before getting out of bed or chair/Non-slip footwear when patient is out of bed/[place] to call system/Physically safe environment - no spills, clutter or unnecessary equipment/Purposeful Proactive Rounding/Room/bathroom lighting operational, light cord in reach

## 2023-09-18 NOTE — OB PROVIDER TRIAGE NOTE - HISTORY OF PRESENT ILLNESS
27 year old female  at 39.6 weeks who presents with strong contractions every 3-5 minutes since last night and denied any LOF VB      feels good FM      PNC   Dr Rocha    denied any ap issues denied any fetal issues   GBS negative   EFW 1 week ago  7.5#

## 2023-09-18 NOTE — OB RN TRIAGE NOTE - FALL HARM RISK - UNIVERSAL INTERVENTIONS
Bed in lowest position, wheels locked, appropriate side rails in place/Call bell, personal items and telephone in reach/Instruct patient to call for assistance before getting out of bed or chair/Non-slip footwear when patient is out of bed/[place] to call system/Physically safe environment - no spills, clutter or unnecessary equipment/Purposeful Proactive Rounding/Room/bathroom lighting operational, light cord in reach

## 2023-09-18 NOTE — OB PROVIDER TRIAGE NOTE - HISTORY OF PRESENT ILLNESS
27y , EGA@ 40.0 weeks, presents for ctx q 6-7 minutes. Patient reports  + fetal movements,  denies leaking of fluid, denies vaginal bleeding. Pt denies any other concerns. Pt seen in triage earlier today and was 3/60/-3.  Antepartum course is significant for: denies

## 2023-09-18 NOTE — OB PROVIDER H&P - NSLOWPPHRISK_OBGYN_A_OB
No previous uterine incision/[place] Pregnancy/Less than or equal to 4 previous vaginal births/No known bleeding disorder/No history of postpartum hemorrhage/No other PPH risks indicated

## 2023-09-18 NOTE — OB RN TRIAGE NOTE - NS_VISITREASON1_OBGYN_ALL_OB
RX PROGRESS NOTE: Vancomycin Therapeutic Drug Monitoring    Day of therapy: 7    Indication and target trough: Osteomyelitis (10-15 mcg/mL)    Current vancomycin dosing regimen: 750 mg IVPB every 12 hours    Most recent height and weight information:  Weight: 61.9 kg (02/09/22 0520)  Height: 5' 8\" (172.7 cm) (02/09/22 0520)    The Following are the Calculated  Current Weights for Micheal Goddard IV     Adjusted Ideal    61.9 kg 68.4 kg          Labs:  Serum Creatinine and Creatinine Clearance:  Serum creatinine: 0.71 mg/dL 02/15/22 0650  Estimated creatinine clearance: 100.5 mL/min    Vancomycin Serum Concentrations:  Vancomycin, Trough (mcg/mL)   Date/Time Value   02/15/2022 1010 17.4       Assessment:  Serum concentration of 17.4 mcg/mL.   Based on the serum concentration, will adjust regimen to vancomycin 500 mg IVPB every 12 hours.    Additional serum concentrations may be necessary depending on pathogen identified, risk factors for adverse events, and/or duration of therapy.  Pharmacy will continue to follow and adjust as needed.    Thank you,    Parish Chicas, PharmD BCPS  2/15/2022 11:51 AM  Contact # [phone]     Labor at Term

## 2023-09-19 ENCOUNTER — APPOINTMENT (OUTPATIENT)
Dept: OBGYN | Facility: CLINIC | Age: 28
End: 2023-09-19

## 2023-09-19 LAB — T PALLIDUM AB TITR SER: NEGATIVE — SIGNIFICANT CHANGE UP

## 2023-09-19 PROCEDURE — 59400 OBSTETRICAL CARE: CPT | Mod: U9

## 2023-09-19 RX ORDER — KETOROLAC TROMETHAMINE 30 MG/ML
30 SYRINGE (ML) INJECTION ONCE
Refills: 0 | Status: DISCONTINUED | OUTPATIENT
Start: 2023-09-19 | End: 2023-09-19

## 2023-09-19 RX ORDER — AER TRAVELER 0.5 G/1
1 SOLUTION RECTAL; TOPICAL EVERY 4 HOURS
Refills: 0 | Status: DISCONTINUED | OUTPATIENT
Start: 2023-09-19 | End: 2023-09-20

## 2023-09-19 RX ORDER — ACETAMINOPHEN 500 MG
975 TABLET ORAL
Refills: 0 | Status: DISCONTINUED | OUTPATIENT
Start: 2023-09-19 | End: 2023-09-20

## 2023-09-19 RX ORDER — BENZOCAINE 10 %
1 GEL (GRAM) MUCOUS MEMBRANE EVERY 6 HOURS
Refills: 0 | Status: DISCONTINUED | OUTPATIENT
Start: 2023-09-19 | End: 2023-09-20

## 2023-09-19 RX ORDER — HYDROCORTISONE 1 %
1 OINTMENT (GRAM) TOPICAL EVERY 6 HOURS
Refills: 0 | Status: DISCONTINUED | OUTPATIENT
Start: 2023-09-19 | End: 2023-09-20

## 2023-09-19 RX ORDER — IBUPROFEN 200 MG
600 TABLET ORAL EVERY 6 HOURS
Refills: 0 | Status: DISCONTINUED | OUTPATIENT
Start: 2023-09-19 | End: 2023-09-20

## 2023-09-19 RX ORDER — IBUPROFEN 200 MG
600 TABLET ORAL EVERY 6 HOURS
Refills: 0 | Status: COMPLETED | OUTPATIENT
Start: 2023-09-19 | End: 2024-08-17

## 2023-09-19 RX ORDER — DIPHENHYDRAMINE HCL 50 MG
25 CAPSULE ORAL EVERY 6 HOURS
Refills: 0 | Status: DISCONTINUED | OUTPATIENT
Start: 2023-09-19 | End: 2023-09-20

## 2023-09-19 RX ORDER — DIBUCAINE 1 %
1 OINTMENT (GRAM) RECTAL EVERY 6 HOURS
Refills: 0 | Status: DISCONTINUED | OUTPATIENT
Start: 2023-09-19 | End: 2023-09-20

## 2023-09-19 RX ORDER — LANOLIN
1 OINTMENT (GRAM) TOPICAL EVERY 6 HOURS
Refills: 0 | Status: DISCONTINUED | OUTPATIENT
Start: 2023-09-19 | End: 2023-09-20

## 2023-09-19 RX ORDER — TETANUS TOXOID, REDUCED DIPHTHERIA TOXOID AND ACELLULAR PERTUSSIS VACCINE, ADSORBED 5; 2.5; 8; 8; 2.5 [IU]/.5ML; [IU]/.5ML; UG/.5ML; UG/.5ML; UG/.5ML
0.5 SUSPENSION INTRAMUSCULAR ONCE
Refills: 0 | Status: DISCONTINUED | OUTPATIENT
Start: 2023-09-19 | End: 2023-09-20

## 2023-09-19 RX ORDER — SIMETHICONE 80 MG/1
80 TABLET, CHEWABLE ORAL EVERY 4 HOURS
Refills: 0 | Status: DISCONTINUED | OUTPATIENT
Start: 2023-09-19 | End: 2023-09-20

## 2023-09-19 RX ORDER — SODIUM CHLORIDE 9 MG/ML
3 INJECTION INTRAMUSCULAR; INTRAVENOUS; SUBCUTANEOUS EVERY 8 HOURS
Refills: 0 | Status: DISCONTINUED | OUTPATIENT
Start: 2023-09-19 | End: 2023-09-20

## 2023-09-19 RX ORDER — MAGNESIUM HYDROXIDE 400 MG/1
30 TABLET, CHEWABLE ORAL
Refills: 0 | Status: DISCONTINUED | OUTPATIENT
Start: 2023-09-19 | End: 2023-09-20

## 2023-09-19 RX ORDER — PRAMOXINE HYDROCHLORIDE 150 MG/15G
1 AEROSOL, FOAM RECTAL EVERY 4 HOURS
Refills: 0 | Status: DISCONTINUED | OUTPATIENT
Start: 2023-09-19 | End: 2023-09-20

## 2023-09-19 RX ADMIN — Medication 975 MILLIGRAM(S): at 21:18

## 2023-09-19 RX ADMIN — Medication 975 MILLIGRAM(S): at 21:55

## 2023-09-19 RX ADMIN — PRAMOXINE HYDROCHLORIDE 1 APPLICATION(S): 150 AEROSOL, FOAM RECTAL at 13:42

## 2023-09-19 RX ADMIN — Medication 1 TABLET(S): at 12:16

## 2023-09-19 RX ADMIN — SODIUM CHLORIDE 3 MILLILITER(S): 9 INJECTION INTRAMUSCULAR; INTRAVENOUS; SUBCUTANEOUS at 13:54

## 2023-09-19 RX ADMIN — Medication 975 MILLIGRAM(S): at 14:54

## 2023-09-19 RX ADMIN — SODIUM CHLORIDE 3 MILLILITER(S): 9 INJECTION INTRAMUSCULAR; INTRAVENOUS; SUBCUTANEOUS at 21:56

## 2023-09-19 RX ADMIN — Medication 600 MILLIGRAM(S): at 18:38

## 2023-09-19 RX ADMIN — Medication 975 MILLIGRAM(S): at 16:48

## 2023-09-19 RX ADMIN — Medication 2 MILLIUNIT(S)/MIN: at 00:18

## 2023-09-19 RX ADMIN — Medication 0.2 MILLIGRAM(S): at 02:16

## 2023-09-19 RX ADMIN — SODIUM CHLORIDE 3 MILLILITER(S): 9 INJECTION INTRAMUSCULAR; INTRAVENOUS; SUBCUTANEOUS at 05:58

## 2023-09-19 RX ADMIN — Medication 30 MILLIGRAM(S): at 02:49

## 2023-09-19 NOTE — OB RN DELIVERY SUMMARY - NSSELHIDDEN_OBGYN_ALL_OB_FT
[NS_DeliveryAttending1_OBGYN_ALL_OB_FT:[id number]==],[NS_DeliveryAssist1_OBGYN_ALL_OB_FT:[email or web address]],[NS_DeliveryRN_OBGYN_ALL_OB_FT:[id number]]

## 2023-09-19 NOTE — DISCHARGE NOTE OB - NS AS DC AMI YN
Renal Medicine Progress Note                                Ry Hroner MRN# [id number]   Age: 53 year old YOB: 1969   Date of Admission: 6/24/2023 Hospital LOS: 22                  Assessment/Plan:     53 y.o man with CKD III due to DM and HTN, consulted for JOHNATHAN.      1) CKD III:                -bl ~ 1.5-1.9 mg/dl (GFR 45-55)               -2.5 g/g albuminuria     2)  Severe Acute Kidney Injury    Improving: Remains Non-oliguric. At baseline creatinine until 6/29 and subsequent rise, more dramatically after OR 6/30.               - on Zosyn/dapto               -urine sodum <20 7/2 reflecting possibe pre-renal state.                -UA with hyaline casts    Renal US with large sized kidneys, especially with underlying CKD, this nephromegaly likely reflects some ATN injury.     Peak Cr 8.7 on 7/8, improving slowly. Diuretic responsive, was making a good amount of urine even without diuretic, but remains ~20 lbs above probable dry weight (240lbs). Started PO diuretics, but remains quite fluid up. Will switch to IV bumex 1mg BID.      3)  Infected left stump with osteomyelitis s/p I&D:               -dapto/zosyn     4) FEN: . Hyponatremia resolved, acidosis improved on bicitra.  Will continue Bicitra for now, likely can stop in the next few days to see continues to diurese and renal function improves.  Phos improving.     5)  Hypertension:                -Jardiance and lisinopril are on hold     6) anemia: Hgb 8.1    Continued gradual improvement in renal function  Continue same diuretic  Follow labs     Interval History:     UO in the 2 liter range  Creatinine with gradual decline   Lytes OK    Follows    No complaints today        ROS:     GENERAL: NAD, No fever,chills  R: NEGATIVE for significant cough or SOB  CV: NEGATIVE for chest pain, palpitations  EXT: no change edema  ROS otherwise negative    Medications and Allergies:     Reviewed    Physical Exam:     Vitals were reviewed  Patient Vitals for  the past 8 hrs:   BP Temp Temp src Pulse Resp SpO2 Weight   07/16/23 0716 (!) 144/84 98.4  F (36.9  C) Oral 71 18 94 % --   07/16/23 0518 -- -- -- -- -- -- 127.9 kg (281 lb 15.5 oz)   07/16/23 0445 -- -- -- -- 16 -- --   07/16/23 0359 (!) 141/80 97.9  F (36.6  C) Axillary -- 16 94 % --     I/O last 3 completed shifts:  In: 1540 [P.O.:1540]  Out: 1000 [Urine:1000]    Vitals:    07/09/23 0615 07/10/23 0700 07/13/23 0500 07/15/23 0638   Weight: 122 kg (268 lb 15.4 oz) 120.9 kg (266 lb 8.6 oz) 126 kg (277 lb 12.5 oz) 128.6 kg (283 lb 8.2 oz)    07/16/23 0518   Weight: 127.9 kg (281 lb 15.5 oz)         GENERAL: awake, alert, follows  HEENT: NC/AT, PERRLA, EOMI, non icteric, pharynx moist without lesion  RESP:  clear anteriorly  CV: RRR, normal S1 S2  MS: no clubbing, cyanosis   SKIN: clear without significant rashes or lesions  NEURO: speech normal and cranial nerves 2-12 intact  PSYCH: affect normal/bright    Data:     Recent Labs   Lab 07/16/23  0652 07/15/23  0642 07/14/23  0540 07/13/23  0545    137 137 138   POTASSIUM 4.3 4.4 4.6 4.4   CHLORIDE 101 99 100 100   CO2 26 24 23 23   ANIONGAP 13 14 14 15   GLC 77 166* 128* 161*   BUN 79.6* 82.0* 83.1* 85.8*   CR 5.96* 6.26* 6.61* 6.54*   GFRESTIMATED 10* 10* 9* 9*   FERNANDO 8.3* 8.2* 8.2* 8.0*     Recent Labs   Lab 07/16/23  0652 07/15/23  0642 07/14/23  0540 07/13/23  0545 07/12/23  0614 07/11/23  0601 07/10/23  0803   CR 5.96* 6.26* 6.61* 6.54* 6.78* 7.27* 7.80*     Recent Labs   Lab Test 07/16/23  0652 07/15/23  0642 07/14/23  0540 07/13/23  0545 07/12/23  0614 07/11/23  0601 07/10/23  0803 07/09/23  0647 07/08/23  0639 07/07/23  0743    137 137 138 137 137 138 136 134* 132*     Recent Labs   Lab 07/16/23  0652 07/15/23  0642 07/14/23  0540 07/13/23  0545 07/12/23  0614   POTASSIUM 4.3 4.4 4.6 4.4 4.1     Recent Labs   Lab 07/14/23  0540 07/13/23  0545 07/12/23  0614 07/11/23  0601   ALBUMIN 2.7* 2.6* 2.6* 2.5*     Recent Labs   Lab 07/16/23  0652  07/15/23  0642 07/14/23  0540 07/13/23  0545 07/12/23  0614 07/11/23  0601   PHOS  --   --  7.1* 6.9* 7.1* 7.6*   HGB 7.4* 7.8* 6.8* 7.4* 7.8* 8.1*         G Rajesh Gonsales MD    [facility] - Nephrology  [phone]   no

## 2023-09-19 NOTE — OB RN DELIVERY SUMMARY - NS_SEPSISRSKCALC_OBGYN_ALL_OB_FT
EOS calculated successfully. EOS Risk Factor: 0.5/1000 live births ([facility] national incidence); GA=40w1d; Temp=98.42; ROM=0.65; GBS='Negative'; Antibiotics='No antibiotics or any antibiotics < 2 hrs prior to birth'

## 2023-09-19 NOTE — DISCHARGE NOTE OB - PATIENT PORTAL LINK FT
You can access the FollowMyHealth Patient Portal offered by [facility] by registering at the following website: [email or web address]. By joining [facility]’s FollowMyHealth portal, you will also be able to view your health information using other applications (apps) compatible with our system.

## 2023-09-19 NOTE — DISCHARGE NOTE OB - CARE PROVIDER_API CALL
Chente Rocha  Maternal/Fetal Medicine  [address], [address]  [place] [address]  Phone: [phone]  Fax: [phone]  Follow Up Time:

## 2023-09-19 NOTE — DISCHARGE NOTE OB - PROVIDER TOKENS
Subjective     Patient ID: Pinky West is a 87 y.o. female.    Chief Complaint: Rash    HPI    She has dark itchy patches on her neck for the last few weeks not better with OTC steroid creams.       Objective     Physical Exam     Dark slightly thickened skin in the folds of her neck on the sides with a few scales    Assessment and Plan     1. Candidiasis    Other orders  -     fluconazole (DIFLUCAN) 100 MG tablet; Take 1 tablet (100 mg total) by mouth once daily. for 10 days  Dispense: 10 tablet; Refill: 0                   
PROVIDER:[TOKEN:[[id number]]]

## 2023-09-19 NOTE — DISCHARGE NOTE OB - CARE PLAN
1 Principal Discharge DX:	Vaginal delivery  Assessment and plan of treatment:	Nothing per vagina. Routine PP care

## 2023-09-19 NOTE — DISCHARGE NOTE OB - NS MD DC FALL RISK RISK
For information on Fall & Injury Prevention, visit: [email or web address] OR  [email or web address] OR  https://www.cdc.gov/steadi/patient.html

## 2023-09-19 NOTE — OB PROVIDER DELIVERY SUMMARY - NSPROVIDERDELIVERYNOTE_OBGYN_ALL_OB_FT
Spontaneous vaginal delivery of liveborn infant from [place]. Head, shoulders and body delivered easily. Infant was suctioned and handed off to mother. Placenta delivered intact with a 3 vessel cord. Fundal massage was given and uterine fundus was found to be firm but had some lower uterine segment atony. IM Methergine given with good effect.. Vaginal exam revealed an intact cervix, vaginal walls and sulci. No laceration was noted. Excellent hemostasis noted. Patient was stable. Count was correct x2

## 2023-09-19 NOTE — OB PROVIDER DELIVERY SUMMARY - NSSELHIDDEN_OBGYN_ALL_OB_FT
[NS_DeliveryAttending1_OBGYN_ALL_OB_FT:[id number]==],[NS_DeliveryAssist1_OBGYN_ALL_OB_FT:[email or web address]=],[NS_DeliveryRN_OBGYN_ALL_OB_FT:[id number]]

## 2023-09-20 VITALS
HEART RATE: 58 BPM | SYSTOLIC BLOOD PRESSURE: 109 MMHG | RESPIRATION RATE: 18 BRPM | DIASTOLIC BLOOD PRESSURE: 64 MMHG | TEMPERATURE: 98 F | OXYGEN SATURATION: 100 %

## 2023-09-20 DIAGNOSIS — O47.1 FALSE LABOR AT OR AFTER 37 COMPLETED WEEKS OF GESTATION: ICD-10-CM

## 2023-09-20 DIAGNOSIS — Z3A.39 39 WEEKS GESTATION OF PREGNANCY: ICD-10-CM

## 2023-09-20 RX ORDER — IBUPROFEN 200 MG
1 TABLET ORAL
Qty: 0 | Refills: 0 | DISCHARGE
Start: 2023-09-20

## 2023-09-20 RX ADMIN — Medication 600 MILLIGRAM(S): at 00:31

## 2023-09-20 RX ADMIN — Medication 600 MILLIGRAM(S): at 08:31

## 2023-09-20 RX ADMIN — Medication 600 MILLIGRAM(S): at 09:30

## 2023-09-20 RX ADMIN — Medication 600 MILLIGRAM(S): at 01:00

## 2023-09-20 NOTE — PROGRESS NOTE ADULT - SUBJECTIVE AND OBJECTIVE BOX
Postpartum Note, Vaginal delivery  Postpartum Day 1    Subjective:  The patient feels well.  She is ambulating.   She is tolerating regular diet.  She denies nausea and vomiting.  She is voiding.  Her pain is controlled.  She reports normal postpartum bleeding.    Physical exam:    Vital Signs Last 24 Hrs  T(C): 36.5 (20 Sep 2023 06:08), Max: 37.1 (19 Sep 2023 14:29)  T(F): 97.7 (20 Sep 2023 06:08), Max: 98.8 (19 Sep 2023 14:29)  HR: 50 (20 Sep 2023 06:08) (50 - 70)  BP: 99/53 (20 Sep 2023 06:08) (99/53 - 112/60)  BP(mean): --  RR: 18 (20 Sep 2023 06:08) (18 - 18)  SpO2: 100% (20 Sep 2023 06:08) (99% - 100%)    Parameters below as of 19 Sep 2023 17:36  Patient On (Oxygen Delivery Method): room air        Gen: NAD    Abdomen: Soft, nontender, no distension , firm uterine fundus at umbilicus.  Pelvic: Normal lochia noted  Ext: No calf tenderness    LABS:                        10.3   7.80  )-----------( 174      ( 18 Sep 2023 22:40 )             33.1       Rubella status:     Allergies    penicillin (Rash)    Intolerances      MEDICATIONS  (STANDING):  acetaminophen     Tablet .. 975 milliGRAM(s) Oral <User Schedule>  diphtheria/tetanus/pertussis (acellular) Vaccine (Adacel) 0.5 milliLiter(s) IntraMuscular once  ibuprofen  Tablet. 600 milliGRAM(s) Oral every 6 hours  influenza   Vaccine 0.5 milliLiter(s) IntraMuscular once  prenatal multivitamin 1 Tablet(s) Oral daily  sodium chloride 0.9% lock flush 3 milliLiter(s) IV Push every 8 hours    MEDICATIONS  (PRN):  benzocaine 20%/menthol 0.5% Spray 1 Spray(s) Topical every 6 hours PRN for Perineal discomfort  dibucaine 1% Ointment 1 Application(s) Topical every 6 hours PRN Perineal discomfort  diphenhydrAMINE 25 milliGRAM(s) Oral every 6 hours PRN Pruritus  hydrocortisone 1% Cream 1 Application(s) Topical every 6 hours PRN Moderate Pain (4-6)  lanolin Ointment 1 Application(s) Topical every 6 hours PRN nipple soreness  magnesium hydroxide Suspension 30 milliLiter(s) Oral two times a day PRN Constipation  pramoxine 1%/zinc 5% Cream 1 Application(s) Topical every 4 hours PRN Moderate Pain (4-6)  simethicone 80 milliGRAM(s) Chew every 4 hours PRN Gas  witch hazel Pads 1 Application(s) Topical every 4 hours PRN Perineal discomfort        Assessment and Plan  PPD 1 S/p vaginal delivery  Doing well, tolerates diet, flatus.  Encourage ambulation and regular diet  Saline lock IV  Continue current [pain meds as needed  DC home today or tomorrow according to criteria.  Instructions reviewed and questiuons answered  Follow up 6 weeks  Alfie Roy MD

## 2023-11-21 ENCOUNTER — APPOINTMENT (OUTPATIENT)
Dept: OBGYN | Facility: CLINIC | Age: 28
End: 2023-11-21
Payer: MEDICAID

## 2023-11-21 VITALS
DIASTOLIC BLOOD PRESSURE: 69 MMHG | HEIGHT: 63 IN | BODY MASS INDEX: 23.74 KG/M2 | WEIGHT: 134 LBS | SYSTOLIC BLOOD PRESSURE: 118 MMHG

## 2023-11-21 PROCEDURE — 0503F POSTPARTUM CARE VISIT: CPT

## 2023-11-24 NOTE — OB RN TRIAGE NOTE - NS_VISITREASON1_OBGYN_ALL_OB
Take the antibiotic as directed. Take a probiotic (such as acidophilus or florigen) while taking this to prevent diarrhea and nausea, take at least 2 hours between antibiotic doses.    Patient Education     Acute Otitis Media with Infection (Child)    Your child has a middle ear infection (acute otitis media). It's caused by bacteria or viruses. The middle ear is the space behind the eardrum. The eustachian tube connects the ear to the nasal passage. The eustachian tubes help drain fluid from the ears. They also keep the air pressure equal inside and outside the ears. These tubes are shorter and more horizontal in children. This makes it more likely for the tubes to become blocked. A blockage lets fluid and pressure build up in the middle ear. Bacteria or fungi can grow in this fluid and cause an ear infection. This infection is commonly known as an earache.   The main symptom of an ear infection is ear pain. Other symptoms may include pulling at the ear, being more fussy than usual, fever, decreased appetite, and vomiting or diarrhea. Your child’s hearing may also be affected. Your child may have had a respiratory infection first.   An ear infection may clear up on its own. Or your child may need to take medicine. After the infection goes away, your child may still have fluid in the middle ear. It may take weeks or months for this fluid to go away. During that time, your child may have temporary hearing loss. But all other symptoms of the earache should be gone.   Home care  Follow these guidelines when caring for your child at home:  The healthcare provider will likely prescribe medicines for pain. The provider may also prescribe antibiotics to treat the infection. These may be liquid medicines to give by mouth. Or they may be ear drops. Follow the provider’s instructions for giving these medicines to your child.  Don't give your child any other medicine without first asking your child's healthcare provider,  especially the first time.  Because ear infections can clear up on their own, the provider may suggest waiting for a few days before giving your child medicines for infection.  To reduce pain, have your child rest in an upright position. Hot or cold compresses held against the ear may help ease pain.  Don't smoke in the house or around your child. Keep your child away from secondhand smoke.  To help prevent future infections:  Don't smoke near your child. Secondhand smoke raises the risk for ear infections in children.  Make sure your child gets all appropriate vaccines.  Don't bottle-feed while your baby is lying on his or her back. (This position can cause middle ear infections because it allows milk to run into the eustachian tubes.)      If you breastfeed, continue until your child is 6 to 12 months of age.  To apply ear drops:  Put the bottle in warm water if the medicine is kept in the refrigerator. Cold drops in the ear are uncomfortable.  Have your child lie down on a flat surface. Gently hold your child’s head to one side.  Remove any drainage from the ear with a clean tissue or cotton swab. Clean only the outer ear. Don’t put the cotton swab into the ear canal.  Straighten the ear canal by gently pulling the earlobe up and back.  Keep the dropper a half-inch above the ear canal. This will keep the dropper from becoming contaminated. Put the drops against the side of the ear canal.  Have your child stay lying down for 2 to 3 minutes. This gives time for the medicine to enter the ear canal. If your child doesn’t have pain, gently massage the outer ear near the opening.  Wipe any extra medicine away from the outer ear with a clean cotton ball.    Follow-up care  Follow up with your child’s healthcare provider as directed. Your child will need to have the ear rechecked to make sure the infection has gone away. Check with the healthcare provider to see when they want to see your child.   Special note to  parents  If your child continues to get earaches, he or she may need ear tubes. The provider will put small tubes in your child’s eardrum to help keep fluid from building up. This procedure is a simple and works well.   When to seek medical advice  Call your child's healthcare provider for any of the following:   Fever (see Fever and children, below)  New symptoms, especially swelling around the ear or weakness of face muscles  Severe pain  Infection seems to get worse, not better   Neck pain  Your child acts very sick or not himself or herself  Fever or pain don't improve with antibiotics after 48 hours  Fever and children  Use a digital thermometer to check your child’s temperature. Don’t use a mercury thermometer. There are different kinds and uses of digital thermometers. They include:   Rectal. For children younger than 3 years, a rectal temperature is the most accurate.  Forehead (temporal). This works for children age 3 months and older. If a child under 3 months old has signs of illness, this can be used for a first pass. The provider may want to confirm with a rectal temperature.  Ear (tympanic). Ear temperatures are accurate after 6 months of age, but not before.  Armpit (axillary). This is the least reliable but may be used for a first pass to check a child of any age with signs of illness. The provider may want to confirm with a rectal temperature.  Mouth (oral). Don’t use a thermometer in your child’s mouth until he or she is at least 4 years old.  Use the rectal thermometer with care. Follow the product maker’s directions for correct use. Insert it gently. Label it and make sure it’s not used in the mouth. It may pass on germs from the stool. If you don’t feel OK using a rectal thermometer, ask the healthcare provider what type to use instead. When you talk with any healthcare provider about your child’s fever, tell him or her which type you used.   Below are guidelines to know if your young child has  a fever. Your child’s healthcare provider may give you different numbers for your child. Follow your provider’s specific instructions.   Fever readings for a baby under 3 months old:   First, ask your child’s healthcare provider how you should take the temperature.  Rectal or forehead: 100.4°F (38°C) or higher  Armpit: 99°F (37.2°C) or higher  Fever readings for a child age 3 months to 36 months (3 years):   Rectal, forehead, or ear: 102°F (38.9°C) or higher  Armpit: 101°F (38.3°C) or higher  Call the healthcare provider in these cases:   Repeated temperature of 104°F (40°C) or higher in a child of any age  Fever of 100.4° F (38° C) or higher in baby younger than 3 months  Fever that lasts more than 24 hours in a child under age 2  Fever that lasts for 3 days in a child age 2 or older    [employer] last reviewed this educational content on 4/1/2020 © 2000-2021 [employer]. All rights reserved. This information is not intended as a substitute for professional medical care. Always follow your healthcare professional's instructions.            Labor at Term

## 2023-12-05 ENCOUNTER — APPOINTMENT (OUTPATIENT)
Dept: OBGYN | Facility: CLINIC | Age: 28
End: 2023-12-05

## 2023-12-21 ENCOUNTER — APPOINTMENT (OUTPATIENT)
Dept: OBGYN | Facility: CLINIC | Age: 28
End: 2023-12-21
Payer: MEDICAID

## 2023-12-21 PROCEDURE — 99213 OFFICE O/P EST LOW 20 MIN: CPT | Mod: 95

## 2023-12-21 NOTE — HISTORY OF PRESENT ILLNESS
[Home] : at home, [unfilled] , at the time of the visit. [Medical Office: ([facility])___] : at the medical office located in  [Verbal consent obtained from patient] : the patient, [unfilled] [FreeTextEntry1] : 27 y/o P4 presenting for consultation for permanent sterilization. The patient was counseled on the option of long-acting reversible contraception but states that she patient desires permanent contraception. The patient was counseled on all methods of sterilization including laparoscopic salpingectomy and vasectomy. She voiced understanding that these methods are not reversible and that she will not be able to become pregnant without ART after the procedure is performed. The patient was counseled on the risk of regret with data from the CREST study indicating that this risk is approximately 20% in women less than age 30, and 6% in women older than 30.   The patient was counseled on the risks and benefits of laparoscopic salpingectomy including but not limited to  bleeding, infection, conversion to laparotomy and damage to the abdomino-pelvic contents including the blood vessels, nerves, bowel, bladder, ureters, uterus and ovaries. She was informed that while highly effective, there is a small chance of failure and informed of the need to inform a clinician immediately if she has signs or symptoms of pregnancy as this may indicate an ectopic pregnancy. She was informed that the risk of failure increases with further time from the initial sterilization procedure. The patient was counseled that salpingectomy has the added benefit of prevention of tubal epithelial cancers. The patient expressed an understanding of the treatment rationale and her questions were answered to her apparent satisfaction. After discussion of the risks/benefits and alternatives, the patient requests to proceed with permanent sterilization.

## 2023-12-21 NOTE — DISCUSSION/SUMMARY
[FreeTextEntry1] : 27 y/o F presenting for counseling for sterilization - Patient to be scheduled for laparoscopic bilateral salpingectomy  - Sterilization consents to be signed -Risks (bleeding, infection, damage to surrounding structures including bladder, bowel, blood vessels), benefits and alternatives discussed as above - All questions/concerns addressed -PST  to be scheduled

## 2023-12-31 NOTE — OB RN DELIVERY SUMMARY - BABY A: APGAR 1 MIN COLOR, DELIVERY
38 y/o F with PMH MVR on coumadin presenting to the ED s/p fall. Per , patient falls occasionally, today was in the house when her legs gave out. Denies head injury. States she was complaining of R leg pain in the lower leg. Patient states her pain has now improved, but she has some lower leg discomfort. She is concerned she has a UTI as she has had frequency and blood in the urine. No fever, chills, N/V. (1) body pink, extremities blue

## 2024-01-04 ENCOUNTER — APPOINTMENT (OUTPATIENT)
Dept: OBGYN | Facility: CLINIC | Age: 29
End: 2024-01-04

## 2024-01-26 LAB
BASOPHILS # BLD AUTO: 0.01 K/UL
BASOPHILS NFR BLD AUTO: 0.3 %
EOSINOPHIL # BLD AUTO: 0.09 K/UL
EOSINOPHIL NFR BLD AUTO: 2.3 %
HCT VFR BLD CALC: 39.2 %
HGB BLD-MCNC: 11.8 G/DL
IMM GRANULOCYTES NFR BLD AUTO: 0.3 %
LYMPHOCYTES # BLD AUTO: 1.62 K/UL
LYMPHOCYTES NFR BLD AUTO: 41.2 %
MAN DIFF?: NORMAL
MCHC RBC-ENTMCNC: 28.9 PG
MCHC RBC-ENTMCNC: 30.1 GM/DL
MCV RBC AUTO: 96.1 FL
MONOCYTES # BLD AUTO: 0.41 K/UL
MONOCYTES NFR BLD AUTO: 10.4 %
NEUTROPHILS # BLD AUTO: 1.79 K/UL
NEUTROPHILS NFR BLD AUTO: 45.5 %
PLATELET # BLD AUTO: 189 K/UL
RBC # BLD: 4.08 M/UL
RBC # FLD: 13 %
WBC # FLD AUTO: 3.93 K/UL

## 2024-02-01 ENCOUNTER — OUTPATIENT (OUTPATIENT)
Dept: OUTPATIENT SERVICES | Facility: HOSPITAL | Age: 29
LOS: 1 days | End: 2024-02-01

## 2024-02-01 VITALS
RESPIRATION RATE: 16 BRPM | DIASTOLIC BLOOD PRESSURE: 55 MMHG | HEIGHT: 63.75 IN | TEMPERATURE: 98 F | OXYGEN SATURATION: 100 % | HEART RATE: 72 BPM | SYSTOLIC BLOOD PRESSURE: 107 MMHG | WEIGHT: 130.07 LBS

## 2024-02-01 DIAGNOSIS — Z64.1 PROBLEMS RELATED TO MULTIPARITY: ICD-10-CM

## 2024-02-01 LAB
BLD GP AB SCN SERPL QL: NEGATIVE — SIGNIFICANT CHANGE UP
HCG UR QL: NEGATIVE — SIGNIFICANT CHANGE UP
RH IG SCN BLD-IMP: POSITIVE — SIGNIFICANT CHANGE UP

## 2024-02-01 RX ORDER — SODIUM CHLORIDE 9 MG/ML
1000 INJECTION, SOLUTION INTRAVENOUS
Refills: 0 | Status: DISCONTINUED | OUTPATIENT
Start: 2024-02-05 | End: 2024-02-19

## 2024-02-01 NOTE — H&P PST ADULT - CARDIOVASCULAR COMMENTS
Pt reported nonspecific CP 1 month ago which lasted 2 min and went away without any intervention  - pt stated that  "thought she was stressed out"

## 2024-02-01 NOTE — H&P PST ADULT - ATTENDING COMMENTS
GYN Attending    27 y/o P4 presenting for laparoscopic bilateral salpingectomy for sterilization. Written informed consent to be obtained.     KRANTHI Wagner-MD Ariel

## 2024-02-01 NOTE — H&P PST ADULT - CARDIOVASCULAR
details… offered no cardiac symptoms/regular rate and rhythm/S1 S2 present/no gallops/no rub/no murmur/no JVD/no pedal edema

## 2024-02-01 NOTE — H&P PST ADULT - PROBLEM SELECTOR PLAN 1
Schedule for laparoscopic bilateral salpingectomy tentatively on 02/05/24. Pre op instructions, famotidine, chlorhexidine gluconate soap given and explained. Pt verbalized understanding.
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
## 2024-02-01 NOTE — H&P PST ADULT - HISTORY OF PRESENT ILLNESS
27 y/o F with no significant PMHx: 4 months post partum, , not currently breastfeeding presents for pre op evaluation for laparoscopy bilateral salpingectomy tentatively on 25.

## 2024-02-01 NOTE — H&P PST ADULT - NEGATIVE ENMT SYMPTOMS
no ear pain/no tinnitus/no vertigo/no sinus symptoms/no nasal congestion/no nasal obstruction/no post-nasal discharge/no nose bleeds/no recurrent cold sores/no abnormal taste sensation/no gum bleeding/no throat pain/no dysphagia

## 2024-02-01 NOTE — H&P PST ADULT - NEGATIVE CARDIOVASCULAR SYMPTOMS
no palpitations/no dyspnea on exertion/no paroxysmal nocturnal dyspnea/no peripheral edema/no claudication

## 2024-02-04 ENCOUNTER — TRANSCRIPTION ENCOUNTER (OUTPATIENT)
Age: 29
End: 2024-02-04

## 2024-02-05 ENCOUNTER — OUTPATIENT (OUTPATIENT)
Dept: OUTPATIENT SERVICES | Facility: HOSPITAL | Age: 29
LOS: 1 days | Discharge: HOME | End: 2024-02-05
Payer: MEDICAID

## 2024-02-05 ENCOUNTER — APPOINTMENT (OUTPATIENT)
Dept: OBGYN | Facility: HOSPITAL | Age: 29
End: 2024-02-05

## 2024-02-05 ENCOUNTER — TRANSCRIPTION ENCOUNTER (OUTPATIENT)
Age: 29
End: 2024-02-05

## 2024-02-05 ENCOUNTER — RESULT REVIEW (OUTPATIENT)
Age: 29
End: 2024-02-05

## 2024-02-05 VITALS
OXYGEN SATURATION: 100 % | WEIGHT: 130.07 LBS | SYSTOLIC BLOOD PRESSURE: 99 MMHG | TEMPERATURE: 98 F | HEIGHT: 63.75 IN | DIASTOLIC BLOOD PRESSURE: 54 MMHG | RESPIRATION RATE: 97 BRPM | HEART RATE: 45 BPM

## 2024-02-05 VITALS
RESPIRATION RATE: 16 BRPM | OXYGEN SATURATION: 100 % | HEART RATE: 48 BPM | SYSTOLIC BLOOD PRESSURE: 120 MMHG | DIASTOLIC BLOOD PRESSURE: 66 MMHG | TEMPERATURE: 98 F

## 2024-02-05 DIAGNOSIS — Z64.1 PROBLEMS RELATED TO MULTIPARITY: ICD-10-CM

## 2024-02-05 LAB — HCG UR QL: NEGATIVE — SIGNIFICANT CHANGE UP

## 2024-02-05 PROCEDURE — 88302 TISSUE EXAM BY PATHOLOGIST: CPT | Mod: 26

## 2024-02-05 PROCEDURE — 58661 LAPAROSCOPY REMOVE ADNEXA: CPT

## 2024-02-05 RX ORDER — ONDANSETRON 8 MG/1
8 TABLET, FILM COATED ORAL ONCE
Refills: 0 | Status: DISCONTINUED | OUTPATIENT
Start: 2024-02-05 | End: 2024-02-19

## 2024-02-05 RX ORDER — OXYCODONE HYDROCHLORIDE 5 MG/1
1 TABLET ORAL
Qty: 5 | Refills: 0
Start: 2024-02-05

## 2024-02-05 RX ORDER — FENTANYL CITRATE 50 UG/ML
50 INJECTION INTRAVENOUS
Refills: 0 | Status: DISCONTINUED | OUTPATIENT
Start: 2024-02-05 | End: 2024-02-05

## 2024-02-05 RX ORDER — FENTANYL CITRATE 50 UG/ML
25 INJECTION INTRAVENOUS
Refills: 0 | Status: DISCONTINUED | OUTPATIENT
Start: 2024-02-05 | End: 2024-02-05

## 2024-02-05 RX ORDER — SODIUM CHLORIDE 9 MG/ML
1000 INJECTION, SOLUTION INTRAVENOUS
Refills: 0 | Status: DISCONTINUED | OUTPATIENT
Start: 2024-02-05 | End: 2024-02-19

## 2024-02-05 NOTE — BRIEF OPERATIVE NOTE - NSICDXBRIEFPOSTOP_GEN_ALL_CORE_FT
POST-OP DIAGNOSIS:  Multiparity 05-Feb-2024 15:02:46  Elsie Gomes  Encounter for female sterilization procedure 05-Feb-2024 15:02:54  Elsie Gomes

## 2024-02-05 NOTE — ASU DISCHARGE PLAN (ADULT/PEDIATRIC) - NS MD DC FALL RISK RISK
For information on Fall & Injury Prevention, visit: [email or web address] OR  [email or web address] OR  https://www.cdc.gov/steadi/patient.html

## 2024-02-05 NOTE — BRIEF OPERATIVE NOTE - SPECIMENS
SUBJECTIVE/OBJECTIVE:                Rudy Beth is a 76 [phone]   L fallopian tube, R fallopian tube

## 2024-02-05 NOTE — ASU DISCHARGE PLAN (ADULT/PEDIATRIC) - NURSING INSTRUCTIONS
You received IV Tylenol for pain management at ___. Please DO NOT take any Tylenol (Acetaminophen) containing products, such as Vicodin, Percocet, Excedrin, and cold medications for the next 6 hours (until ___ PM). DO NOT TAKE MORE THAN 3000 MG OF TYLENOL in a 24 hour period.   You received IV Toradol for pain management at ___. Please DO NOT take Motrin/Ibuprofen/Advil/Aleve/NSAIDs (Non-Steroidal Anti-Inflammatory Drugs) for the next 6 hours (until ___ PM). You received IV Tylenol for pain management at _14:15 AM__. Please DO NOT take any Tylenol (Acetaminophen) containing products, such as Vicodin, Percocet, Excedrin, and cold medications for the next 6 hours (until __08:15_ PM). DO NOT TAKE MORE THAN 3000 MG OF TYLENOL in a 24 hour period.   You received IV Toradol for pain management at 14:45 pm ___. Please DO NOT take Motrin/Ibuprofen/Advil/Aleve/NSAIDs (Non-Steroidal Anti-Inflammatory Drugs) for the next 6 hours (until _08:45__ PM).

## 2024-02-05 NOTE — BRIEF OPERATIVE NOTE - NSICDXBRIEFPREOP_GEN_ALL_CORE_FT
PRE-OP DIAGNOSIS:  Multiparity 05-Feb-2024 15:02:30  Elsie Gomes  Encounter for female sterilization procedure 05-Feb-2024 15:02:39  Elsie Gomes

## 2024-02-05 NOTE — CHART NOTE - NSCHARTNOTEFT_GEN_A_CORE
R2 OBGYN POST-OP CHECK    S: Patient seen and evaluated at bedside.  Pt awake and alert resting comfortably in bed.  Reports some abdominal pain that is tolerable, has been refusing pain medication.  Has ambulated to bathroom and voided spontaneously.  Pt denies N/V, SOB, CP, palpitations, fever/chills. Tolerating clears.     O:   T(C): 36.6 (02-05-24 @ 16:00), Max: 36.6 (02-05-24 @ 16:00)  HR: 46 (02-05-24 @ 17:00) (43 - 50)  BP: 121/68 (02-05-24 @ 17:00) (89/47 - 122/69)  RR: 15 (02-05-24 @ 17:00) (12 - 15)  SpO2: 100% (02-05-24 @ 17:00) (95% - 100%)  Wt(kg): --  I&O's Summary    05 Feb 2024 07:01  -  05 Feb 2024 17:29  --------------------------------------------------------  IN: 615 mL / OUT: 250 mL / NET: 365 mL        Gen: Resting comfortably in bed, NAD  CV: bradycardic - however at baseline from preop  Lungs: no labored breathing on RA  Abd: soft, nontender, nondistended  Inc: 3 lsc port sites c/d/i covered with opsites   Ext: non-tender b/l, no edema        A/P: 28y Female s/p laproscopic bilateral salpingectomy.  Patient stable and progressing appropriately postoperatively.     Neuro: PO Analgesia PRN    CV: Hemodynamically stable.  Monitor VS  Pulm: Saturating well on room air.  Encourage OOB and incentive spirometer use.   GI: Advance to regular diet. Anti-emetics PRN.  : Voiding spontaneously  FEN: Electrolytes: LR@125cc/hr.    Heme: DVT ppx w/ SCD's while in bed. Early ambulation, initially with assistance then as tolerated.    ID: Afebrile  Endo: No active issues   Dispo: Discharge from PACU when criteria met.     Discussed with Dr. Nelson Kelley, PGY2

## 2024-02-05 NOTE — ASU DISCHARGE PLAN (ADULT/PEDIATRIC) - CARE PROVIDER_API CALL
Sample-Rosa M George  Obstetrics and Gynecology  [address], [address]  [place] [address]  Phone: [phone]  Fax: [phone]  Follow Up Time:

## 2024-02-15 ENCOUNTER — APPOINTMENT (OUTPATIENT)
Dept: OBGYN | Facility: CLINIC | Age: 29
End: 2024-02-15
Payer: MEDICAID

## 2024-02-15 VITALS
DIASTOLIC BLOOD PRESSURE: 69 MMHG | HEIGHT: 63 IN | SYSTOLIC BLOOD PRESSURE: 105 MMHG | WEIGHT: 132 LBS | BODY MASS INDEX: 23.39 KG/M2

## 2024-02-15 PROCEDURE — 99024 POSTOP FOLLOW-UP VISIT: CPT

## 2024-02-15 NOTE — PLAN
[FreeTextEntry1] : 27 y/o F presenting for post operative check -Patient recovering well -She is cleared to resume normal activity including sexual intercourse and exercise -f/u for annual visit

## 2024-02-15 NOTE — HISTORY OF PRESENT ILLNESS
[Pain is well-controlled] : pain is well-controlled [Clean/Dry/Intact] : clean, dry and intact [de-identified] : Patient is a 27 y/o F s/p LSC BS  on 2/5 for sterilization. Patient is feeling well today. Pain well controlled, ambulating, voiding, tolerating PO. Denies subjective fevers and chills.   Pathology still pending

## 2024-02-16 LAB — SURGICAL PATHOLOGY STUDY: SIGNIFICANT CHANGE UP

## 2024-02-20 NOTE — OB PROVIDER H&P - ATTENDING COMMENTS
PAST MEDICAL HISTORY:  No pertinent past medical history     
Pt admitted in labor.  Approved for epidural, augmentation PRN.

## 2024-03-14 ENCOUNTER — APPOINTMENT (OUTPATIENT)
Dept: OBGYN | Facility: CLINIC | Age: 29
End: 2024-03-14

## 2024-05-13 ENCOUNTER — APPOINTMENT (OUTPATIENT)
Dept: OBGYN | Facility: CLINIC | Age: 29
End: 2024-05-13
Payer: MEDICAID

## 2024-05-13 VITALS
WEIGHT: 129 LBS | BODY MASS INDEX: 22.86 KG/M2 | DIASTOLIC BLOOD PRESSURE: 67 MMHG | HEIGHT: 63 IN | SYSTOLIC BLOOD PRESSURE: 103 MMHG

## 2024-05-13 DIAGNOSIS — Z3A.16 16 WEEKS GESTATION OF PREGNANCY: ICD-10-CM

## 2024-05-13 DIAGNOSIS — Z01.419 ENCOUNTER FOR GYNECOLOGICAL EXAMINATION (GENERAL) (ROUTINE) W/OUT ABNORMAL FINDINGS: ICD-10-CM

## 2024-05-13 DIAGNOSIS — Z3A.25 25 WEEKS GESTATION OF PREGNANCY: ICD-10-CM

## 2024-05-13 DIAGNOSIS — O03.4 INCOMPLETE SPONTANEOUS ABORTION W/OUT COMPLICATION: ICD-10-CM

## 2024-05-13 PROCEDURE — 99459 PELVIC EXAMINATION: CPT

## 2024-05-13 PROCEDURE — 99395 PREV VISIT EST AGE 18-39: CPT

## 2024-05-13 RX ORDER — NORETHINDRONE ACETATE 5 MG/1
5 TABLET ORAL DAILY
Qty: 30 | Refills: 0 | Status: ACTIVE | COMMUNITY
Start: 2024-05-13 | End: 1900-01-01

## 2024-05-13 RX ORDER — DOXYLAMINE SUCCINATE AND PYRIDOXINE HYDROCHLORIDE 10; 10 MG/1; MG/1
10-10 TABLET, DELAYED RELEASE ORAL
Qty: 90 | Refills: 3 | Status: DISCONTINUED | COMMUNITY
Start: 2023-01-26 | End: 2024-05-13

## 2024-05-13 RX ORDER — CEPHALEXIN 500 MG/1
500 TABLET ORAL 3 TIMES DAILY
Qty: 21 | Refills: 0 | Status: DISCONTINUED | COMMUNITY
Start: 2023-02-02 | End: 2024-05-13

## 2024-05-13 RX ORDER — ONDANSETRON 4 MG/1
4 TABLET ORAL
Qty: 60 | Refills: 3 | Status: DISCONTINUED | COMMUNITY
Start: 2023-02-09 | End: 2024-05-13

## 2024-05-13 RX ORDER — PRENATAL VIT NO.126/IRON/FOLIC 28MG-0.8MG
28-0.8 TABLET ORAL DAILY
Qty: 30 | Refills: 6 | Status: DISCONTINUED | COMMUNITY
Start: 2023-01-26 | End: 2024-05-13

## 2024-05-13 NOTE — HISTORY OF PRESENT ILLNESS
[Regular Cycle Intervals] : periods have been regular [Currently Active] : currently active [Men] : men [Vaginal] : vaginal [No] : No [Patient refuses STI testing] : Patient refuses STI testing [PGHxTotal] : 5 [[facility]xFullTerm] : 4 [PGHxPremature] : 0 [PGHxAbortions] : 1 [[facility]Living] : 4 [PGHxABInduced] : 0 [PGHxABSpont] : 0 [PGHxEctopic] : 0 [PGHxMultBirths] : 0 [FreeTextEntry1] : 04/29/24 [FreeTextEntry3] : tubal

## 2024-05-13 NOTE — PHYSICAL EXAM
[Chaperone Present] : A chaperone was present in the examining room during all aspects of the physical examination [[id number]] : A chaperone was present during the pelvic exam. [Appropriately responsive] : appropriately responsive [Alert] : alert [No Acute Distress] : no acute distress [Soft] : soft [Non-tender] : non-tender [Non-distended] : non-distended [No HSM] : No HSM [No Lesions] : no lesions [No Mass] : no mass [Oriented x3] : oriented x3 [Examination Of The Breasts] : a normal appearance [No Masses] : no breast masses were palpable [Labia Majora] : normal [Labia Minora] : normal [Normal] : normal [Uterine Adnexae] : normal [FreeTextEntry2] : Susan

## 2024-05-13 NOTE — PLAN
[FreeTextEntry1] : 27 y/o female presenting for annual exam: -f/u pap and GC/CT -Contraception: BTL - rx for aygestin sent to pharmacy. Advised patient to start taking 1 week prior to menses to delay. Advised her when she stops pills after wedding she will get her menses. -f/u for pelvic sono due to pelvic pain

## 2024-05-15 LAB
C TRACH RRNA SPEC QL NAA+PROBE: NOT DETECTED
HPV 16 E6+E7 MRNA CVX QL NAA+PROBE: NOT DETECTED
HPV HIGH+LOW RISK DNA PNL CVX: NOT DETECTED
HPV HIGH+LOW RISK DNA PNL CVX: NOT DETECTED
HPV18+45 E6+E7 MRNA CVX QL NAA+PROBE: NOT DETECTED
N GONORRHOEA RRNA SPEC QL NAA+PROBE: NOT DETECTED
SOURCE AMPLIFICATION: NORMAL

## 2024-05-19 LAB — CYTOLOGY CVX/VAG DOC THIN PREP: NORMAL

## 2024-06-13 ENCOUNTER — APPOINTMENT (OUTPATIENT)
Dept: OBGYN | Facility: CLINIC | Age: 29
End: 2024-06-13

## 2024-06-13 ENCOUNTER — APPOINTMENT (OUTPATIENT)
Dept: ANTEPARTUM | Facility: CLINIC | Age: 29
End: 2024-06-13

## 2024-12-03 NOTE — OB RN PATIENT PROFILE - TEACHING/LEARNING FACTORS INFLUENCE READINESS TO LEARN OTHER
Patient participated in Animal-Assisted Therapy visit today with (Pat) at bedside. Written consent received and placed in paper chart.    Estrellita Esquivel, Child Life Intern     none

## 2025-01-08 NOTE — ASU PREOP CHECKLIST - NS PREOP CHK MONITOR ANESTHESIA CONSENT
Her kidney function is  but stable.   Her potassium is low. Have her to confirm which medication she was not taking.   Needs to take potassium. Will send in rx. 
done

## 2025-02-04 ENCOUNTER — APPOINTMENT (OUTPATIENT)
Dept: OBGYN | Facility: CLINIC | Age: 30
End: 2025-02-04
Payer: MEDICAID

## 2025-02-04 VITALS
WEIGHT: 131 LBS | DIASTOLIC BLOOD PRESSURE: 71 MMHG | SYSTOLIC BLOOD PRESSURE: 109 MMHG | HEIGHT: 63 IN | BODY MASS INDEX: 23.21 KG/M2

## 2025-02-04 PROCEDURE — 99459 PELVIC EXAMINATION: CPT

## 2025-02-04 PROCEDURE — 99213 OFFICE O/P EST LOW 20 MIN: CPT

## 2025-02-04 RX ORDER — NORETHINDRONE ACETATE AND ETHINYL ESTRADIOL 1; 20 MG/1; UG/1
1-20 TABLET ORAL DAILY
Qty: 1 | Refills: 3 | Status: ACTIVE | COMMUNITY
Start: 2025-02-04 | End: 1900-01-01

## 2025-02-06 LAB
CANDIDA VAG CYTO: NOT DETECTED
G VAGINALIS+PREV SP MTYP VAG QL MICRO: NOT DETECTED
T VAGINALIS VAG QL WET PREP: NOT DETECTED

## 2025-02-07 LAB
APPEARANCE: CLEAR
BACTERIA: ABNORMAL /HPF
BILIRUBIN URINE: NEGATIVE
BLOOD URINE: ABNORMAL
CAST: 2 /LPF
COLOR: NORMAL
EPITHELIAL CELLS: 32 /HPF
GLUCOSE QUALITATIVE U: NEGATIVE MG/DL
KETONES URINE: NEGATIVE MG/DL
LEUKOCYTE ESTERASE URINE: ABNORMAL
MICROSCOPIC-UA: NORMAL
NITRITE URINE: POSITIVE
PH URINE: 6.5
PROTEIN URINE: NORMAL MG/DL
RED BLOOD CELLS URINE: 4 /HPF
REVIEW: NORMAL
SPECIFIC GRAVITY URINE: 1.03
UROBILINOGEN URINE: 1 MG/DL
WHITE BLOOD CELLS URINE: 31 /HPF

## 2025-02-07 RX ORDER — NITROFURANTOIN (MONOHYDRATE/MACROCRYSTALS) 25; 75 MG/1; MG/1
100 CAPSULE ORAL
Qty: 14 | Refills: 0 | Status: ACTIVE | COMMUNITY
Start: 2025-02-07 | End: 1900-01-01

## 2025-02-09 LAB — BACTERIA UR CULT: ABNORMAL

## 2025-04-23 NOTE — OB PROVIDER H&P - NSSCHADMISSION_OBGYN_A_OB
PAST SURGICAL HISTORY:  History of hysteroscopy     S/P bilateral breast lumpectomy     S/P breast biopsy     S/P laparoscopy     
No

## 2025-05-13 NOTE — OB PROVIDER H&P - NS_PELVICEXAM_OBGYN_ALL_OB
Refill Request     CONFIRM preferred pharmacy with the patient.    If Mail Order Rx - Pend for 90 day refill.      Last Seen: Last Seen Department: 1/29/2025  Last Seen by PCP: 1/29/2025    Last Written: atorvastatin 11/12/24 90 with 1 refill     Duloxetine 9/23/24 90 with 1 refill     Folic acid  12/23/24 90 with no refills     If no future appointment scheduled:  Review the last OV with PCP and review information for follow-up visit,  Route STAFF MESSAGE with patient name to the  Pool for scheduling with the following information:            -  Timing of next visit           -  Visit type ie Physical, OV, etc           -  Diagnoses/Reason ie. COPD, HTN - Do not use MEDICATION, Follow-up or CHECK UP - Give reason for visit      Next Appointment:   Future Appointments   Date Time Provider Department Center   6/4/2025 12:00 PM Reji Pinto DO [facility]       Message sent to  to schedule appt with patient?  NO      Requested Prescriptions     Pending Prescriptions Disp Refills    DULoxetine (CYMBALTA) 60 MG extended release capsule [Pharmacy Med Name: DULoxetine HCL DR 60 MG CAPSULE] 90 capsule 1     Sig: TAKE 1 CAPSULE BY MOUTH DAILY (ALONG WITH 30 MG CAPSULE)    folic acid (FOLVITE) 1 MG tablet [Pharmacy Med Name: FOLIC ACID 1 MG TABLET] 90 tablet 0     Sig: TAKE 1 TABLET BY MOUTH DAILY    atorvastatin (LIPITOR) 40 MG tablet [Pharmacy Med Name: ATORVASTATIN 40 MG TABLET] 90 tablet 1     Sig: TAKE 1 TABLET BY MOUTH DAILY        Yes

## (undated) DEVICE — SOL IRR POUR NS 0.9% 500ML

## (undated) DEVICE — SUT VICRYL 0 27" UR-6

## (undated) DEVICE — BLADE SURGICAL #15 CARBON

## (undated) DEVICE — GLV 6.5 PROTEXIS (CREAM) MICRO

## (undated) DEVICE — GLV 7 PROTEXIS (CREAM) MICRO

## (undated) DEVICE — TROCAR COVIDIEN VERSAPORT BLADELESS OPTICAL 5MM STANDARD

## (undated) DEVICE — FOLEY TRAY 16FR 5CC LF UMETER CLOSED

## (undated) DEVICE — LIGASURE BLUNT TIP 37CM

## (undated) DEVICE — UTERINE MANIPULATOR COOPER SURGICAL 5MM 33CM GREEN

## (undated) DEVICE — DURABLE MEDICAL EQUIPMENT: Type: DURABLE MEDICAL EQUIPMENT

## (undated) DEVICE — TROCAR COVIDIEN VERSAONE BLADELESS FIXATION 5MM STANDARD

## (undated) DEVICE — PACK D&C

## (undated) DEVICE — WARMING BLANKET FULL ADULT

## (undated) DEVICE — TRAP SPECIMEN SPUTUM 40CC

## (undated) DEVICE — INSUFFLATION NDL COVIDIEN SURGINEEDLE VERESS 120MM

## (undated) DEVICE — SOL IRR POUR H2O 500ML

## (undated) DEVICE — TROCAR COVIDIEN VERSAONE BLUNT TIP HASSAN 12MM

## (undated) DEVICE — GOWN XL

## (undated) DEVICE — LABELS BLANK W PEN

## (undated) DEVICE — BASIN SET SINGLE

## (undated) DEVICE — TIP METZENBAUM SCISSOR MONOPOLAR ENDOCUT (ORANGE)

## (undated) DEVICE — SYR LUER LOK 50CC

## (undated) DEVICE — SUT MONOCRYL 4-0 27" PS-2 UNDYED

## (undated) DEVICE — TUBING TRUWAVE PRESSURE MALE/FEMALE 12"

## (undated) DEVICE — DRSG TEGADERM 1.75X1.75"

## (undated) DEVICE — ELCTR GROUNDING PAD ADULT COVIDIEN

## (undated) DEVICE — PACK GENERAL LAPAROSCOPY

## (undated) DEVICE — TROCAR COVIDIEN BLUNT TIP HASSAN 10MM STANDARD

## (undated) DEVICE — TUBING HYDRO-SURG PLUS IRRIGATOR W SMOKEVAC & PROBE

## (undated) DEVICE — VENODYNE/SCD SLEEVE CALF MEDIUM

## (undated) DEVICE — PROTECTOR HEEL / ELBOW FLUFFY

## (undated) DEVICE — POSITIONER FOAM OR TABLE PAD CONVOLUTED (PINK)

## (undated) DEVICE — ENDOCATCH 10MM SPECIMEN POUCH

## (undated) DEVICE — D HELP - CLEARVIEW CLEARIFY SYSTEM

## (undated) DEVICE — POSITIONER STRAP ARMBOARD VELCRO TS-30

## (undated) DEVICE — ELCTR BOVIE PENCIL SMOKE EVACUATION

## (undated) DEVICE — PREP BETADINE SPONGE STICKS

## (undated) DEVICE — GLV 6.5 PROTEXIS (WHITE)

## (undated) DEVICE — FOLEY CATH 2-WAY 16FR 5CC LATEX LUBRICATH

## (undated) DEVICE — TUBING OLYMPUS INSUFFLATION